# Patient Record
Sex: MALE | Race: WHITE | Employment: OTHER | URBAN - METROPOLITAN AREA
[De-identification: names, ages, dates, MRNs, and addresses within clinical notes are randomized per-mention and may not be internally consistent; named-entity substitution may affect disease eponyms.]

---

## 2017-01-03 ENCOUNTER — CARE COORDINATION (OUTPATIENT)
Dept: CARE COORDINATION | Age: 82
End: 2017-01-03

## 2017-01-04 ENCOUNTER — CARE COORDINATION (OUTPATIENT)
Dept: PRIMARY CARE CLINIC | Age: 82
End: 2017-01-04

## 2017-01-04 ENCOUNTER — ANTI-COAG VISIT (OUTPATIENT)
Dept: PHARMACY | Age: 82
End: 2017-01-04

## 2017-01-04 ENCOUNTER — OFFICE VISIT (OUTPATIENT)
Dept: PRIMARY CARE CLINIC | Age: 82
End: 2017-01-04

## 2017-01-04 VITALS
WEIGHT: 203 LBS | TEMPERATURE: 97 F | RESPIRATION RATE: 16 BRPM | HEART RATE: 68 BPM | DIASTOLIC BLOOD PRESSURE: 62 MMHG | HEIGHT: 69 IN | OXYGEN SATURATION: 90 % | SYSTOLIC BLOOD PRESSURE: 122 MMHG | BODY MASS INDEX: 30.07 KG/M2

## 2017-01-04 DIAGNOSIS — J18.9 PNEUMONIA DUE TO INFECTIOUS ORGANISM, UNSPECIFIED LATERALITY, UNSPECIFIED PART OF LUNG: Primary | ICD-10-CM

## 2017-01-04 DIAGNOSIS — R09.02 HYPOXIA: ICD-10-CM

## 2017-01-04 DIAGNOSIS — B35.6 TINEA CRURIS: ICD-10-CM

## 2017-01-04 DIAGNOSIS — R06.02 SHORTNESS OF BREATH: ICD-10-CM

## 2017-01-04 DIAGNOSIS — I48.0 PAROXYSMAL ATRIAL FIBRILLATION (HCC): ICD-10-CM

## 2017-01-04 DIAGNOSIS — I48.91 ATRIAL FIBRILLATION, UNSPECIFIED TYPE (HCC): ICD-10-CM

## 2017-01-04 PROCEDURE — 99495 TRANSJ CARE MGMT MOD F2F 14D: CPT | Performed by: FAMILY MEDICINE

## 2017-01-04 RX ORDER — DOXYCYCLINE HYCLATE 100 MG/1
100 CAPSULE ORAL 2 TIMES DAILY
Qty: 10 CAPSULE | Refills: 0 | Status: SHIPPED | OUTPATIENT
Start: 2017-01-04 | End: 2017-01-09

## 2017-01-04 RX ORDER — DOXYCYCLINE HYCLATE 100 MG/1
CAPSULE ORAL
COMMUNITY
Start: 2017-01-03 | End: 2017-04-20

## 2017-01-04 RX ORDER — NYSTATIN 100000 [USP'U]/G
POWDER TOPICAL
Qty: 60 G | Refills: 3 | Status: SHIPPED | OUTPATIENT
Start: 2017-01-04 | End: 2017-04-20

## 2017-01-04 ASSESSMENT — ENCOUNTER SYMPTOMS
NAUSEA: 0
EYE DISCHARGE: 0
FACIAL SWELLING: 0
WHEEZING: 0
STRIDOR: 0
PHOTOPHOBIA: 0
COLOR CHANGE: 0
CONSTIPATION: 0
APNEA: 0
EYE PAIN: 0
DIARRHEA: 0
CHOKING: 0
EYE REDNESS: 0
CHEST TIGHTNESS: 0
ABDOMINAL PAIN: 0

## 2017-01-06 ENCOUNTER — HOSPITAL ENCOUNTER (OUTPATIENT)
Dept: PHARMACY | Age: 82
Discharge: HOME | End: 2017-01-06
Payer: MEDICARE

## 2017-01-06 DIAGNOSIS — I48.91 ATRIAL FIBRILLATION, UNSPECIFIED TYPE (HCC): ICD-10-CM

## 2017-01-06 LAB
INR BLD: 1.2
PROTIME: 13.8 SECONDS

## 2017-01-06 PROCEDURE — G0463 HOSPITAL OUTPT CLINIC VISIT: HCPCS | Performed by: PHARMACIST

## 2017-01-06 PROCEDURE — 85610 PROTHROMBIN TIME: CPT | Performed by: PHARMACIST

## 2017-01-12 ENCOUNTER — CARE COORDINATION (OUTPATIENT)
Dept: CARE COORDINATION | Age: 82
End: 2017-01-12

## 2017-01-13 ENCOUNTER — HOSPITAL ENCOUNTER (OUTPATIENT)
Dept: PHARMACY | Age: 82
Discharge: HOME | End: 2017-01-13
Payer: MEDICARE

## 2017-01-13 DIAGNOSIS — I48.91 ATRIAL FIBRILLATION, UNSPECIFIED TYPE (HCC): ICD-10-CM

## 2017-01-13 LAB
INR BLD: 1.2
PROTIME: 14.8 SECONDS

## 2017-01-13 PROCEDURE — G0463 HOSPITAL OUTPT CLINIC VISIT: HCPCS

## 2017-01-13 PROCEDURE — 85610 PROTHROMBIN TIME: CPT

## 2017-01-16 RX ORDER — PAROXETINE HYDROCHLORIDE 20 MG/1
TABLET, FILM COATED ORAL
Qty: 30 TABLET | Refills: 5 | Status: SHIPPED | OUTPATIENT
Start: 2017-01-16 | End: 2017-06-02 | Stop reason: SDUPTHER

## 2017-01-16 RX ORDER — SIMVASTATIN 20 MG
TABLET ORAL
Qty: 30 TABLET | Refills: 3 | Status: SHIPPED | OUTPATIENT
Start: 2017-01-16 | End: 2017-05-10 | Stop reason: SDUPTHER

## 2017-01-25 ENCOUNTER — CARE COORDINATOR VISIT (OUTPATIENT)
Dept: PRIMARY CARE CLINIC | Age: 82
End: 2017-01-25

## 2017-01-25 ENCOUNTER — OFFICE VISIT (OUTPATIENT)
Dept: PRIMARY CARE CLINIC | Age: 82
End: 2017-01-25

## 2017-01-25 VITALS
RESPIRATION RATE: 16 BRPM | TEMPERATURE: 97.4 F | HEIGHT: 68 IN | DIASTOLIC BLOOD PRESSURE: 54 MMHG | SYSTOLIC BLOOD PRESSURE: 84 MMHG | BODY MASS INDEX: 31.22 KG/M2 | HEART RATE: 55 BPM | OXYGEN SATURATION: 85 % | WEIGHT: 206 LBS

## 2017-01-25 DIAGNOSIS — G62.9 ACQUIRED POLYNEUROPATHY: ICD-10-CM

## 2017-01-25 DIAGNOSIS — R00.1 BRADYCARDIA: ICD-10-CM

## 2017-01-25 DIAGNOSIS — J18.9 PNEUMONIA DUE TO INFECTIOUS ORGANISM, UNSPECIFIED LATERALITY, UNSPECIFIED PART OF LUNG: Primary | ICD-10-CM

## 2017-01-25 DIAGNOSIS — I48.0 PAROXYSMAL ATRIAL FIBRILLATION (HCC): ICD-10-CM

## 2017-01-25 DIAGNOSIS — R06.09 DOE (DYSPNEA ON EXERTION): ICD-10-CM

## 2017-01-25 DIAGNOSIS — Q79.1 EVENTRATIONS, DIAPHRAGMATIC: ICD-10-CM

## 2017-01-25 DIAGNOSIS — I48.91 ATRIAL FIBRILLATION, UNSPECIFIED TYPE (HCC): ICD-10-CM

## 2017-01-25 PROCEDURE — 99214 OFFICE O/P EST MOD 30 MIN: CPT | Performed by: FAMILY MEDICINE

## 2017-01-25 PROCEDURE — G8419 CALC BMI OUT NRM PARAM NOF/U: HCPCS | Performed by: FAMILY MEDICINE

## 2017-01-25 PROCEDURE — 1123F ACP DISCUSS/DSCN MKR DOCD: CPT | Performed by: FAMILY MEDICINE

## 2017-01-25 PROCEDURE — G8484 FLU IMMUNIZE NO ADMIN: HCPCS | Performed by: FAMILY MEDICINE

## 2017-01-25 PROCEDURE — G8427 DOCREV CUR MEDS BY ELIG CLIN: HCPCS | Performed by: FAMILY MEDICINE

## 2017-01-25 PROCEDURE — 1036F TOBACCO NON-USER: CPT | Performed by: FAMILY MEDICINE

## 2017-01-25 PROCEDURE — 4040F PNEUMOC VAC/ADMIN/RCVD: CPT | Performed by: FAMILY MEDICINE

## 2017-01-25 PROCEDURE — G8598 ASA/ANTIPLAT THER USED: HCPCS | Performed by: FAMILY MEDICINE

## 2017-01-25 RX ORDER — LEVOFLOXACIN 500 MG/1
500 TABLET, FILM COATED ORAL DAILY
Qty: 10 TABLET | Refills: 0 | Status: SHIPPED | OUTPATIENT
Start: 2017-01-25 | End: 2017-02-04

## 2017-01-25 RX ORDER — TRIAMTERENE AND HYDROCHLOROTHIAZIDE 37.5; 25 MG/1; MG/1
1 TABLET ORAL DAILY
Qty: 30 TABLET | Refills: 5 | Status: SHIPPED | OUTPATIENT
Start: 2017-01-25 | End: 2017-04-20

## 2017-01-25 RX ORDER — LISINOPRIL 2.5 MG/1
TABLET ORAL
Refills: 3 | COMMUNITY
Start: 2017-01-17 | End: 2017-04-20

## 2017-01-25 ASSESSMENT — ENCOUNTER SYMPTOMS
SHORTNESS OF BREATH: 1
HEMOPTYSIS: 0
HOARSE VOICE: 0
TROUBLE SWALLOWING: 0
ABDOMINAL PAIN: 0
BOWEL INCONTINENCE: 0
CHEST TIGHTNESS: 0
FREQUENT THROAT CLEARING: 0
SORE THROAT: 0
RHINORRHEA: 1
DIFFICULTY BREATHING: 0
HEARTBURN: 0
COUGH: 0
BACK PAIN: 1
SPUTUM PRODUCTION: 0
WHEEZING: 0

## 2017-01-27 ENCOUNTER — HOSPITAL ENCOUNTER (OUTPATIENT)
Dept: PHARMACY | Age: 82
Discharge: HOME | End: 2017-01-27
Payer: MEDICARE

## 2017-01-27 DIAGNOSIS — I48.91 ATRIAL FIBRILLATION, UNSPECIFIED TYPE (HCC): ICD-10-CM

## 2017-01-27 LAB
INR BLD: 1.7
PROTIME: 20.6 SECONDS

## 2017-01-27 PROCEDURE — 85610 PROTHROMBIN TIME: CPT

## 2017-01-27 PROCEDURE — G0463 HOSPITAL OUTPT CLINIC VISIT: HCPCS

## 2017-02-10 ENCOUNTER — HOSPITAL ENCOUNTER (OUTPATIENT)
Dept: PHARMACY | Age: 82
Discharge: HOME | End: 2017-02-10
Payer: MEDICARE

## 2017-02-10 DIAGNOSIS — I48.91 ATRIAL FIBRILLATION, UNSPECIFIED TYPE (HCC): ICD-10-CM

## 2017-02-10 LAB
INR BLD: 1.7
PROTIME: 19.8 SECONDS

## 2017-02-10 PROCEDURE — 85610 PROTHROMBIN TIME: CPT | Performed by: PHARMACIST

## 2017-02-10 PROCEDURE — G0463 HOSPITAL OUTPT CLINIC VISIT: HCPCS | Performed by: PHARMACIST

## 2017-02-22 ENCOUNTER — CARE COORDINATION (OUTPATIENT)
Dept: PRIMARY CARE CLINIC | Age: 82
End: 2017-02-22

## 2017-02-24 ENCOUNTER — HOSPITAL ENCOUNTER (OUTPATIENT)
Dept: PHARMACY | Age: 82
Discharge: HOME | End: 2017-02-24
Payer: MEDICARE

## 2017-02-24 DIAGNOSIS — I48.91 ATRIAL FIBRILLATION, UNSPECIFIED TYPE (HCC): ICD-10-CM

## 2017-02-24 LAB
INR BLD: 1.7
PROTIME: 20.7 SECONDS

## 2017-02-24 PROCEDURE — 85610 PROTHROMBIN TIME: CPT

## 2017-02-24 PROCEDURE — G0463 HOSPITAL OUTPT CLINIC VISIT: HCPCS

## 2017-03-10 ENCOUNTER — HOSPITAL ENCOUNTER (OUTPATIENT)
Dept: PHARMACY | Age: 82
Discharge: HOME | End: 2017-03-10
Payer: MEDICARE

## 2017-03-10 DIAGNOSIS — I48.91 ATRIAL FIBRILLATION, UNSPECIFIED TYPE (HCC): ICD-10-CM

## 2017-03-10 LAB
INR BLD: 4.1
PROTIME: 48.8 SECONDS

## 2017-03-10 PROCEDURE — 85610 PROTHROMBIN TIME: CPT | Performed by: PHARMACIST

## 2017-03-10 PROCEDURE — G0463 HOSPITAL OUTPT CLINIC VISIT: HCPCS | Performed by: PHARMACIST

## 2017-03-22 ENCOUNTER — CARE COORDINATION (OUTPATIENT)
Dept: CARE COORDINATION | Age: 82
End: 2017-03-22

## 2017-03-24 ENCOUNTER — HOSPITAL ENCOUNTER (OUTPATIENT)
Dept: PHARMACY | Age: 82
Discharge: HOME | End: 2017-03-24
Payer: MEDICARE

## 2017-03-24 DIAGNOSIS — I48.91 ATRIAL FIBRILLATION, UNSPECIFIED TYPE (HCC): ICD-10-CM

## 2017-03-24 LAB
INR BLD: 2.4
PROTIME: 28.5 SECONDS

## 2017-03-24 PROCEDURE — G0463 HOSPITAL OUTPT CLINIC VISIT: HCPCS | Performed by: PHARMACIST

## 2017-03-24 PROCEDURE — 85610 PROTHROMBIN TIME: CPT | Performed by: PHARMACIST

## 2017-04-08 ENCOUNTER — OFFICE VISIT (OUTPATIENT)
Dept: PRIMARY CARE CLINIC | Age: 82
End: 2017-04-08

## 2017-04-08 VITALS
HEART RATE: 64 BPM | WEIGHT: 205 LBS | RESPIRATION RATE: 12 BRPM | DIASTOLIC BLOOD PRESSURE: 60 MMHG | HEIGHT: 68 IN | TEMPERATURE: 97.8 F | BODY MASS INDEX: 31.07 KG/M2 | SYSTOLIC BLOOD PRESSURE: 100 MMHG

## 2017-04-08 DIAGNOSIS — N62 GYNECOMASTIA: ICD-10-CM

## 2017-04-08 DIAGNOSIS — R53.83 OTHER FATIGUE: ICD-10-CM

## 2017-04-08 DIAGNOSIS — N40.0 BENIGN PROSTATIC HYPERPLASIA, PRESENCE OF LOWER URINARY TRACT SYMPTOMS UNSPECIFIED, UNSPECIFIED MORPHOLOGY: ICD-10-CM

## 2017-04-08 DIAGNOSIS — I38 VALVULAR HEART DISEASE: ICD-10-CM

## 2017-04-08 DIAGNOSIS — C45.9 MESOTHELIOMA (HCC): ICD-10-CM

## 2017-04-08 DIAGNOSIS — E11.8 TYPE 2 DIABETES MELLITUS WITH COMPLICATION, WITHOUT LONG-TERM CURRENT USE OF INSULIN (HCC): ICD-10-CM

## 2017-04-08 DIAGNOSIS — H91.93 HEARING LOSS, BILATERAL: ICD-10-CM

## 2017-04-08 DIAGNOSIS — R44.0 AUDITORY HALLUCINATIONS: Primary | ICD-10-CM

## 2017-04-08 DIAGNOSIS — R44.0 AUDITORY HALLUCINATIONS: ICD-10-CM

## 2017-04-08 LAB
ALBUMIN SERPL-MCNC: 3.7 G/DL (ref 3.9–4.9)
ALP BLD-CCNC: 70 U/L (ref 35–104)
ALT SERPL-CCNC: 12 U/L (ref 0–41)
ANION GAP SERPL CALCULATED.3IONS-SCNC: 8 MEQ/L (ref 7–13)
AST SERPL-CCNC: 18 U/L (ref 0–40)
BASOPHILS ABSOLUTE: 0 K/UL (ref 0–0.2)
BASOPHILS RELATIVE PERCENT: 0.5 %
BILIRUB SERPL-MCNC: 0.4 MG/DL (ref 0–1.2)
BUN BLDV-MCNC: 17 MG/DL (ref 8–23)
CALCIUM SERPL-MCNC: 9.3 MG/DL (ref 8.6–10.2)
CHLORIDE BLD-SCNC: 98 MEQ/L (ref 98–107)
CO2: 34 MEQ/L (ref 22–29)
CREAT SERPL-MCNC: 0.66 MG/DL (ref 0.7–1.2)
CREATININE URINE: 75.5 MG/DL
EOSINOPHILS ABSOLUTE: 0.5 K/UL (ref 0–0.7)
EOSINOPHILS RELATIVE PERCENT: 10.5 %
GFR AFRICAN AMERICAN: >60
GFR NON-AFRICAN AMERICAN: >60
GLOBULIN: 2.4 G/DL (ref 2.3–3.5)
GLUCOSE BLD-MCNC: 116 MG/DL (ref 74–109)
HBA1C MFR BLD: 5.7 % (ref 4.8–5.9)
HCT VFR BLD CALC: 36 % (ref 42–52)
HEMOGLOBIN: 11.5 G/DL (ref 14–18)
LYMPHOCYTES ABSOLUTE: 1 K/UL (ref 1–4.8)
LYMPHOCYTES RELATIVE PERCENT: 19.6 %
MCH RBC QN AUTO: 30.7 PG (ref 27–31.3)
MCHC RBC AUTO-ENTMCNC: 32 % (ref 33–37)
MCV RBC AUTO: 95.9 FL (ref 80–100)
MICROALBUMIN UR-MCNC: <1.2 MG/DL
MICROALBUMIN/CREAT UR-RTO: NORMAL MG/G (ref 0–30)
MONOCYTES ABSOLUTE: 0.5 K/UL (ref 0.2–0.8)
MONOCYTES RELATIVE PERCENT: 10.8 %
NEUTROPHILS ABSOLUTE: 2.9 K/UL (ref 1.4–6.5)
NEUTROPHILS RELATIVE PERCENT: 58.6 %
PDW BLD-RTO: 14.9 % (ref 11.5–14.5)
PLATELET # BLD: 155 K/UL (ref 130–400)
POTASSIUM SERPL-SCNC: 4.2 MEQ/L (ref 3.5–5.1)
RBC # BLD: 3.76 M/UL (ref 4.7–6.1)
SEDIMENTATION RATE, ERYTHROCYTE: 15 MM (ref 0–20)
SODIUM BLD-SCNC: 140 MEQ/L (ref 132–144)
TOTAL PROTEIN: 6.1 G/DL (ref 6.4–8.1)
TSH SERPL DL<=0.05 MIU/L-ACNC: 1.2 UIU/ML (ref 0.27–4.2)
WBC # BLD: 4.9 K/UL (ref 4.8–10.8)

## 2017-04-08 PROCEDURE — 1123F ACP DISCUSS/DSCN MKR DOCD: CPT | Performed by: FAMILY MEDICINE

## 2017-04-08 PROCEDURE — 1036F TOBACCO NON-USER: CPT | Performed by: FAMILY MEDICINE

## 2017-04-08 PROCEDURE — G8417 CALC BMI ABV UP PARAM F/U: HCPCS | Performed by: FAMILY MEDICINE

## 2017-04-08 PROCEDURE — G8598 ASA/ANTIPLAT THER USED: HCPCS | Performed by: FAMILY MEDICINE

## 2017-04-08 PROCEDURE — 99214 OFFICE O/P EST MOD 30 MIN: CPT | Performed by: FAMILY MEDICINE

## 2017-04-08 PROCEDURE — 4040F PNEUMOC VAC/ADMIN/RCVD: CPT | Performed by: FAMILY MEDICINE

## 2017-04-08 PROCEDURE — G8427 DOCREV CUR MEDS BY ELIG CLIN: HCPCS | Performed by: FAMILY MEDICINE

## 2017-04-08 RX ORDER — CHLORHEXIDINE GLUCONATE 0.12 MG/ML
15 RINSE ORAL 2 TIMES DAILY
Qty: 420 ML | Refills: 0 | Status: SHIPPED | OUTPATIENT
Start: 2017-04-08 | End: 2017-04-20

## 2017-04-08 ASSESSMENT — ENCOUNTER SYMPTOMS
ABDOMINAL PAIN: 0
BOWEL INCONTINENCE: 0
VOMITING: 0
BACK PAIN: 0
VISUAL CHANGE: 0
NAUSEA: 0
SHORTNESS OF BREATH: 0

## 2017-04-11 ENCOUNTER — HOSPITAL ENCOUNTER (OUTPATIENT)
Dept: CT IMAGING | Age: 82
Discharge: HOME | End: 2017-04-11
Payer: MEDICARE

## 2017-04-11 DIAGNOSIS — R44.0 AUDITORY HALLUCINATIONS: ICD-10-CM

## 2017-04-11 DIAGNOSIS — C45.9 MESOTHELIOMA (HCC): ICD-10-CM

## 2017-04-11 PROCEDURE — 70470 CT HEAD/BRAIN W/O & W/DYE: CPT

## 2017-04-11 PROCEDURE — 6360000004 HC RX CONTRAST MEDICATION: Performed by: FAMILY MEDICINE

## 2017-04-11 RX ORDER — FLUTICASONE PROPIONATE AND SALMETEROL XINAFOATE 115; 21 UG/1; UG/1
AEROSOL, METERED RESPIRATORY (INHALATION)
Qty: 12 INHALER | Refills: 3 | Status: SHIPPED | OUTPATIENT
Start: 2017-04-11 | End: 2018-01-31 | Stop reason: SDUPTHER

## 2017-04-11 RX ADMIN — IOVERSOL 50 ML: 678 INJECTION INTRA-ARTERIAL; INTRAVENOUS at 11:09

## 2017-04-14 ENCOUNTER — HOSPITAL ENCOUNTER (OUTPATIENT)
Dept: PHARMACY | Age: 82
Discharge: HOME | End: 2017-04-14
Payer: MEDICARE

## 2017-04-14 DIAGNOSIS — I48.91 ATRIAL FIBRILLATION, UNSPECIFIED TYPE (HCC): ICD-10-CM

## 2017-04-14 LAB
INR BLD: 2.1
PROTIME: 25.1 SECONDS

## 2017-04-14 PROCEDURE — 85610 PROTHROMBIN TIME: CPT | Performed by: PHARMACIST

## 2017-04-14 PROCEDURE — G0463 HOSPITAL OUTPT CLINIC VISIT: HCPCS | Performed by: PHARMACIST

## 2017-04-19 ENCOUNTER — CARE COORDINATION (OUTPATIENT)
Dept: CARE COORDINATION | Age: 82
End: 2017-04-19

## 2017-04-20 ENCOUNTER — OFFICE VISIT (OUTPATIENT)
Dept: SURGERY | Age: 82
End: 2017-04-20

## 2017-04-20 VITALS
SYSTOLIC BLOOD PRESSURE: 120 MMHG | DIASTOLIC BLOOD PRESSURE: 60 MMHG | WEIGHT: 197 LBS | TEMPERATURE: 97 F | BODY MASS INDEX: 29.95 KG/M2

## 2017-04-20 DIAGNOSIS — N62 GYNECOMASTIA, MALE: Primary | ICD-10-CM

## 2017-04-20 PROCEDURE — 4040F PNEUMOC VAC/ADMIN/RCVD: CPT | Performed by: SURGERY

## 2017-04-20 PROCEDURE — 1123F ACP DISCUSS/DSCN MKR DOCD: CPT | Performed by: SURGERY

## 2017-04-20 PROCEDURE — G8427 DOCREV CUR MEDS BY ELIG CLIN: HCPCS | Performed by: SURGERY

## 2017-04-20 PROCEDURE — G8420 CALC BMI NORM PARAMETERS: HCPCS | Performed by: SURGERY

## 2017-04-20 PROCEDURE — 99202 OFFICE O/P NEW SF 15 MIN: CPT | Performed by: SURGERY

## 2017-04-20 PROCEDURE — G8598 ASA/ANTIPLAT THER USED: HCPCS | Performed by: SURGERY

## 2017-04-20 PROCEDURE — 1036F TOBACCO NON-USER: CPT | Performed by: SURGERY

## 2017-04-20 ASSESSMENT — ENCOUNTER SYMPTOMS
GASTROINTESTINAL NEGATIVE: 1
EYES NEGATIVE: 1
CHEST TIGHTNESS: 0
ABDOMINAL PAIN: 0
RHINORRHEA: 0
COLOR CHANGE: 0
CONSTIPATION: 0
BLOOD IN STOOL: 0
RESPIRATORY NEGATIVE: 1
ABDOMINAL DISTENTION: 0
SHORTNESS OF BREATH: 0
ALLERGIC/IMMUNOLOGIC NEGATIVE: 1

## 2017-05-10 RX ORDER — SIMVASTATIN 20 MG
TABLET ORAL
Qty: 30 TABLET | Refills: 5 | Status: SHIPPED | OUTPATIENT
Start: 2017-05-10 | End: 2017-11-19 | Stop reason: SDUPTHER

## 2017-05-11 RX ORDER — ZOLPIDEM TARTRATE 10 MG/1
TABLET ORAL
Qty: 90 TABLET | Refills: 1 | Status: SHIPPED | OUTPATIENT
Start: 2017-05-11 | End: 2017-05-26 | Stop reason: SDUPTHER

## 2017-05-12 ENCOUNTER — HOSPITAL ENCOUNTER (OUTPATIENT)
Dept: PHARMACY | Age: 82
Discharge: HOME | End: 2017-05-12
Payer: MEDICARE

## 2017-05-12 DIAGNOSIS — I48.91 ATRIAL FIBRILLATION, UNSPECIFIED TYPE (HCC): ICD-10-CM

## 2017-05-12 LAB
INR BLD: 3
PROTIME: 36.2 SECONDS

## 2017-05-12 PROCEDURE — 99211 OFF/OP EST MAY X REQ PHY/QHP: CPT

## 2017-05-12 PROCEDURE — 85610 PROTHROMBIN TIME: CPT

## 2017-05-16 ENCOUNTER — CARE COORDINATION (OUTPATIENT)
Dept: PRIMARY CARE CLINIC | Age: 82
End: 2017-05-16

## 2017-06-02 RX ORDER — PAROXETINE HYDROCHLORIDE 20 MG/1
TABLET, FILM COATED ORAL
Qty: 30 TABLET | Refills: 5 | Status: SHIPPED | OUTPATIENT
Start: 2017-06-02 | End: 2017-08-15 | Stop reason: DRUGHIGH

## 2017-06-09 ENCOUNTER — HOSPITAL ENCOUNTER (OUTPATIENT)
Dept: PHARMACY | Age: 82
Discharge: HOME | End: 2017-06-09
Payer: MEDICARE

## 2017-06-09 DIAGNOSIS — I48.91 ATRIAL FIBRILLATION, UNSPECIFIED TYPE (HCC): ICD-10-CM

## 2017-06-09 LAB
INR BLD: 2.6
PROTIME: 31.2 SECONDS

## 2017-06-09 PROCEDURE — 99211 OFF/OP EST MAY X REQ PHY/QHP: CPT

## 2017-06-09 PROCEDURE — 85610 PROTHROMBIN TIME: CPT

## 2017-06-27 ENCOUNTER — CARE COORDINATION (OUTPATIENT)
Dept: CARE COORDINATION | Age: 82
End: 2017-06-27

## 2017-07-14 ENCOUNTER — OFFICE VISIT (OUTPATIENT)
Dept: PRIMARY CARE CLINIC | Age: 82
End: 2017-07-14

## 2017-07-14 ENCOUNTER — HOSPITAL ENCOUNTER (OUTPATIENT)
Dept: PHARMACY | Age: 82
Setting detail: THERAPIES SERIES
Discharge: HOME | End: 2017-07-14
Payer: MEDICARE

## 2017-07-14 VITALS
OXYGEN SATURATION: 91 % | TEMPERATURE: 98.1 F | RESPIRATION RATE: 17 BRPM | SYSTOLIC BLOOD PRESSURE: 108 MMHG | HEIGHT: 68 IN | HEART RATE: 76 BPM | WEIGHT: 206 LBS | DIASTOLIC BLOOD PRESSURE: 72 MMHG | BODY MASS INDEX: 31.22 KG/M2

## 2017-07-14 DIAGNOSIS — R44.0 CONTINUOUS AUDITORY HALLUCINATIONS: Primary | ICD-10-CM

## 2017-07-14 DIAGNOSIS — H91.93 HEARING LOSS, BILATERAL: ICD-10-CM

## 2017-07-14 DIAGNOSIS — I48.91 ATRIAL FIBRILLATION, UNSPECIFIED TYPE (HCC): ICD-10-CM

## 2017-07-14 DIAGNOSIS — G47.9 SLEEP DISTURBANCE: ICD-10-CM

## 2017-07-14 LAB
INR BLD: 3.7
PROTIME: 44.1 SECONDS

## 2017-07-14 PROCEDURE — 99214 OFFICE O/P EST MOD 30 MIN: CPT | Performed by: FAMILY MEDICINE

## 2017-07-14 PROCEDURE — 1123F ACP DISCUSS/DSCN MKR DOCD: CPT | Performed by: FAMILY MEDICINE

## 2017-07-14 PROCEDURE — G8598 ASA/ANTIPLAT THER USED: HCPCS | Performed by: FAMILY MEDICINE

## 2017-07-14 PROCEDURE — 4040F PNEUMOC VAC/ADMIN/RCVD: CPT | Performed by: FAMILY MEDICINE

## 2017-07-14 PROCEDURE — G0444 DEPRESSION SCREEN ANNUAL: HCPCS | Performed by: FAMILY MEDICINE

## 2017-07-14 PROCEDURE — 99211 OFF/OP EST MAY X REQ PHY/QHP: CPT | Performed by: PHARMACIST

## 2017-07-14 PROCEDURE — G8427 DOCREV CUR MEDS BY ELIG CLIN: HCPCS | Performed by: FAMILY MEDICINE

## 2017-07-14 PROCEDURE — G8417 CALC BMI ABV UP PARAM F/U: HCPCS | Performed by: FAMILY MEDICINE

## 2017-07-14 PROCEDURE — 85610 PROTHROMBIN TIME: CPT | Performed by: PHARMACIST

## 2017-07-14 PROCEDURE — 3288F FALL RISK ASSESSMENT DOCD: CPT | Performed by: FAMILY MEDICINE

## 2017-07-14 PROCEDURE — 1036F TOBACCO NON-USER: CPT | Performed by: FAMILY MEDICINE

## 2017-07-14 RX ORDER — DIAZEPAM 2 MG/1
2 TABLET ORAL 4 TIMES DAILY
Qty: 40 TABLET | Refills: 1 | Status: SHIPPED | OUTPATIENT
Start: 2017-07-14 | End: 2017-07-21 | Stop reason: DRUGHIGH

## 2017-07-14 RX ORDER — DIAZEPAM 2 MG/1
2 TABLET ORAL EVERY 6 HOURS
Qty: 120 TABLET | Refills: 0 | Status: CANCELLED | OUTPATIENT
Start: 2017-07-14

## 2017-07-14 ASSESSMENT — PATIENT HEALTH QUESTIONNAIRE - PHQ9
9. THOUGHTS THAT YOU WOULD BE BETTER OFF DEAD, OR OF HURTING YOURSELF: 0
1. LITTLE INTEREST OR PLEASURE IN DOING THINGS: 3
5. POOR APPETITE OR OVEREATING: 0
SUM OF ALL RESPONSES TO PHQ9 QUESTIONS 1 & 2: 6
6. FEELING BAD ABOUT YOURSELF - OR THAT YOU ARE A FAILURE OR HAVE LET YOURSELF OR YOUR FAMILY DOWN: 0
10. IF YOU CHECKED OFF ANY PROBLEMS, HOW DIFFICULT HAVE THESE PROBLEMS MADE IT FOR YOU TO DO YOUR WORK, TAKE CARE OF THINGS AT HOME, OR GET ALONG WITH OTHER PEOPLE: 1
SUM OF ALL RESPONSES TO PHQ QUESTIONS 1-9: 15
7. TROUBLE CONCENTRATING ON THINGS, SUCH AS READING THE NEWSPAPER OR WATCHING TELEVISION: 2
8. MOVING OR SPEAKING SO SLOWLY THAT OTHER PEOPLE COULD HAVE NOTICED. OR THE OPPOSITE, BEING SO FIGETY OR RESTLESS THAT YOU HAVE BEEN MOVING AROUND A LOT MORE THAN USUAL: 1
3. TROUBLE FALLING OR STAYING ASLEEP: 3
2. FEELING DOWN, DEPRESSED OR HOPELESS: 3
4. FEELING TIRED OR HAVING LITTLE ENERGY: 3

## 2017-07-14 ASSESSMENT — ENCOUNTER SYMPTOMS
VOMITING: 0
VISUAL CHANGE: 0
BOWEL INCONTINENCE: 0
NAUSEA: 0
SHORTNESS OF BREATH: 0
ABDOMINAL PAIN: 0

## 2017-07-19 RX ORDER — TRIAMTERENE AND HYDROCHLOROTHIAZIDE 37.5; 25 MG/1; MG/1
TABLET ORAL
Qty: 30 TABLET | Refills: 4 | Status: SHIPPED | OUTPATIENT
Start: 2017-07-19 | End: 2017-12-18 | Stop reason: SDUPTHER

## 2017-07-21 ENCOUNTER — OFFICE VISIT (OUTPATIENT)
Dept: PRIMARY CARE CLINIC | Age: 82
End: 2017-07-21

## 2017-07-21 VITALS
HEART RATE: 68 BPM | HEIGHT: 68 IN | SYSTOLIC BLOOD PRESSURE: 88 MMHG | WEIGHT: 205 LBS | DIASTOLIC BLOOD PRESSURE: 52 MMHG | RESPIRATION RATE: 14 BRPM | TEMPERATURE: 98.3 F | BODY MASS INDEX: 31.07 KG/M2 | OXYGEN SATURATION: 81 %

## 2017-07-21 DIAGNOSIS — R10.11 RIGHT UPPER QUADRANT ABDOMINAL PAIN: ICD-10-CM

## 2017-07-21 DIAGNOSIS — R44.0 AUDITORY HALLUCINATIONS: Primary | ICD-10-CM

## 2017-07-21 DIAGNOSIS — I95.9 HYPOTENSION, UNSPECIFIED HYPOTENSION TYPE: ICD-10-CM

## 2017-07-21 DIAGNOSIS — R09.02 HYPOXIA: ICD-10-CM

## 2017-07-21 PROCEDURE — 4040F PNEUMOC VAC/ADMIN/RCVD: CPT | Performed by: FAMILY MEDICINE

## 2017-07-21 PROCEDURE — G8598 ASA/ANTIPLAT THER USED: HCPCS | Performed by: FAMILY MEDICINE

## 2017-07-21 PROCEDURE — G8427 DOCREV CUR MEDS BY ELIG CLIN: HCPCS | Performed by: FAMILY MEDICINE

## 2017-07-21 PROCEDURE — 1036F TOBACCO NON-USER: CPT | Performed by: FAMILY MEDICINE

## 2017-07-21 PROCEDURE — 1123F ACP DISCUSS/DSCN MKR DOCD: CPT | Performed by: FAMILY MEDICINE

## 2017-07-21 PROCEDURE — 99214 OFFICE O/P EST MOD 30 MIN: CPT | Performed by: FAMILY MEDICINE

## 2017-07-21 PROCEDURE — G8417 CALC BMI ABV UP PARAM F/U: HCPCS | Performed by: FAMILY MEDICINE

## 2017-07-21 RX ORDER — DIAZEPAM 5 MG/1
5 TABLET ORAL EVERY 6 HOURS PRN
Qty: 120 TABLET | Refills: 0 | Status: CANCELLED | OUTPATIENT
Start: 2017-07-21 | End: 2017-07-31

## 2017-07-21 RX ORDER — LISINOPRIL 2.5 MG/1
TABLET ORAL
Refills: 3 | COMMUNITY
Start: 2017-07-09 | End: 2017-07-21

## 2017-07-21 RX ORDER — DIAZEPAM 5 MG/1
5 TABLET ORAL EVERY 8 HOURS PRN
Qty: 30 TABLET | Refills: 1 | Status: SHIPPED | OUTPATIENT
Start: 2017-07-21 | End: 2017-07-31

## 2017-07-21 ASSESSMENT — ENCOUNTER SYMPTOMS
WHEEZING: 0
EYE DISCHARGE: 0
COLOR CHANGE: 0
STRIDOR: 0
VOMITING: 0
BOWEL INCONTINENCE: 0
NAUSEA: 0
CHEST TIGHTNESS: 0
PHOTOPHOBIA: 0
CHOKING: 0
FACIAL SWELLING: 0
EYE REDNESS: 0
DIARRHEA: 0
APNEA: 0
SHORTNESS OF BREATH: 1
ABDOMINAL PAIN: 1
CONSTIPATION: 0
EYE PAIN: 0
VISUAL CHANGE: 0
BACK PAIN: 0

## 2017-07-24 ENCOUNTER — TELEPHONE (OUTPATIENT)
Dept: PRIMARY CARE CLINIC | Age: 82
End: 2017-07-24

## 2017-07-28 ENCOUNTER — HOSPITAL ENCOUNTER (OUTPATIENT)
Dept: PHARMACY | Age: 82
Setting detail: THERAPIES SERIES
Discharge: HOME | End: 2017-07-28
Payer: MEDICARE

## 2017-07-28 DIAGNOSIS — I48.91 ATRIAL FIBRILLATION, UNSPECIFIED TYPE (HCC): ICD-10-CM

## 2017-07-28 LAB
INR BLD: 4.4
PROTIME: 52.4 SECONDS

## 2017-07-28 PROCEDURE — 85610 PROTHROMBIN TIME: CPT

## 2017-07-28 PROCEDURE — 99211 OFF/OP EST MAY X REQ PHY/QHP: CPT

## 2017-08-01 ENCOUNTER — OFFICE VISIT (OUTPATIENT)
Dept: PRIMARY CARE CLINIC | Age: 82
End: 2017-08-01

## 2017-08-01 VITALS
DIASTOLIC BLOOD PRESSURE: 58 MMHG | WEIGHT: 212 LBS | RESPIRATION RATE: 16 BRPM | HEIGHT: 68 IN | HEART RATE: 60 BPM | TEMPERATURE: 98 F | SYSTOLIC BLOOD PRESSURE: 112 MMHG | BODY MASS INDEX: 32.13 KG/M2

## 2017-08-01 DIAGNOSIS — E53.8 B12 DEFICIENCY: ICD-10-CM

## 2017-08-01 DIAGNOSIS — E78.5 HYPERLIPIDEMIA, UNSPECIFIED HYPERLIPIDEMIA TYPE: ICD-10-CM

## 2017-08-01 DIAGNOSIS — Z79.01 WARFARIN ANTICOAGULATION: ICD-10-CM

## 2017-08-01 DIAGNOSIS — I95.9 HYPOTENSION, UNSPECIFIED HYPOTENSION TYPE: ICD-10-CM

## 2017-08-01 DIAGNOSIS — E11.8 TYPE 2 DIABETES MELLITUS WITH COMPLICATION, WITHOUT LONG-TERM CURRENT USE OF INSULIN (HCC): Primary | ICD-10-CM

## 2017-08-01 DIAGNOSIS — E11.8 TYPE 2 DIABETES MELLITUS WITH COMPLICATION, WITHOUT LONG-TERM CURRENT USE OF INSULIN (HCC): ICD-10-CM

## 2017-08-01 DIAGNOSIS — M47.812 CERVICAL SPONDYLOSIS WITHOUT MYELOPATHY: ICD-10-CM

## 2017-08-01 DIAGNOSIS — I38 VALVULAR HEART DISEASE: ICD-10-CM

## 2017-08-01 DIAGNOSIS — I10 ESSENTIAL HYPERTENSION: ICD-10-CM

## 2017-08-01 DIAGNOSIS — I25.10 CORONARY ARTERY DISEASE INVOLVING NATIVE CORONARY ARTERY OF NATIVE HEART WITHOUT ANGINA PECTORIS: ICD-10-CM

## 2017-08-01 LAB
ALBUMIN SERPL-MCNC: 3.5 G/DL (ref 3.9–4.9)
ALP BLD-CCNC: 81 U/L (ref 35–104)
ALT SERPL-CCNC: 12 U/L (ref 0–41)
ANION GAP SERPL CALCULATED.3IONS-SCNC: 11 MEQ/L (ref 7–13)
AST SERPL-CCNC: 14 U/L (ref 0–40)
BILIRUB SERPL-MCNC: 0.5 MG/DL (ref 0–1.2)
BUN BLDV-MCNC: 15 MG/DL (ref 8–23)
CALCIUM SERPL-MCNC: 9.1 MG/DL (ref 8.6–10.2)
CHLORIDE BLD-SCNC: 102 MEQ/L (ref 98–107)
CO2: 33 MEQ/L (ref 22–29)
CREAT SERPL-MCNC: 0.64 MG/DL (ref 0.7–1.2)
GFR AFRICAN AMERICAN: >60
GFR NON-AFRICAN AMERICAN: >60
GLOBULIN: 2.3 G/DL (ref 2.3–3.5)
GLUCOSE BLD-MCNC: 114 MG/DL (ref 74–109)
MAGNESIUM: 2 MG/DL (ref 1.7–2.3)
POTASSIUM SERPL-SCNC: 4.9 MEQ/L (ref 3.5–5.1)
SODIUM BLD-SCNC: 146 MEQ/L (ref 132–144)
TOTAL PROTEIN: 5.8 G/DL (ref 6.4–8.1)

## 2017-08-01 PROCEDURE — G8598 ASA/ANTIPLAT THER USED: HCPCS | Performed by: FAMILY MEDICINE

## 2017-08-01 PROCEDURE — 96372 THER/PROPH/DIAG INJ SC/IM: CPT | Performed by: FAMILY MEDICINE

## 2017-08-01 PROCEDURE — 1123F ACP DISCUSS/DSCN MKR DOCD: CPT | Performed by: FAMILY MEDICINE

## 2017-08-01 PROCEDURE — 99214 OFFICE O/P EST MOD 30 MIN: CPT | Performed by: FAMILY MEDICINE

## 2017-08-01 PROCEDURE — 1036F TOBACCO NON-USER: CPT | Performed by: FAMILY MEDICINE

## 2017-08-01 PROCEDURE — G8417 CALC BMI ABV UP PARAM F/U: HCPCS | Performed by: FAMILY MEDICINE

## 2017-08-01 PROCEDURE — G8427 DOCREV CUR MEDS BY ELIG CLIN: HCPCS | Performed by: FAMILY MEDICINE

## 2017-08-01 PROCEDURE — 4040F PNEUMOC VAC/ADMIN/RCVD: CPT | Performed by: FAMILY MEDICINE

## 2017-08-01 RX ORDER — CYANOCOBALAMIN 1000 UG/ML
1000 INJECTION INTRAMUSCULAR; SUBCUTANEOUS ONCE
Status: COMPLETED | OUTPATIENT
Start: 2017-08-01 | End: 2017-08-01

## 2017-08-01 RX ADMIN — CYANOCOBALAMIN 1000 MCG: 1000 INJECTION INTRAMUSCULAR; SUBCUTANEOUS at 09:57

## 2017-08-01 ASSESSMENT — ENCOUNTER SYMPTOMS
EYE PAIN: 0
COLOR CHANGE: 0
APNEA: 0
CONSTIPATION: 1
CHOKING: 0
EYE REDNESS: 0
EYE DISCHARGE: 0
CHEST TIGHTNESS: 0
WHEEZING: 0
NAUSEA: 0
STRIDOR: 0
DIARRHEA: 0
FACIAL SWELLING: 0
ABDOMINAL PAIN: 0
PHOTOPHOBIA: 0

## 2017-08-02 ENCOUNTER — HOSPITAL ENCOUNTER (OUTPATIENT)
Dept: ULTRASOUND IMAGING | Age: 82
Discharge: HOME | End: 2017-08-02
Payer: MEDICARE

## 2017-08-02 DIAGNOSIS — R10.11 RIGHT UPPER QUADRANT ABDOMINAL PAIN: ICD-10-CM

## 2017-08-02 PROCEDURE — 76705 ECHO EXAM OF ABDOMEN: CPT

## 2017-08-14 ENCOUNTER — ANTI-COAG VISIT (OUTPATIENT)
Dept: PHARMACY | Age: 82
End: 2017-08-14

## 2017-08-14 DIAGNOSIS — I48.91 ATRIAL FIBRILLATION, UNSPECIFIED TYPE (HCC): ICD-10-CM

## 2017-08-15 ENCOUNTER — OFFICE VISIT (OUTPATIENT)
Dept: PRIMARY CARE CLINIC | Age: 82
End: 2017-08-15

## 2017-08-15 VITALS
RESPIRATION RATE: 12 BRPM | HEIGHT: 68 IN | BODY MASS INDEX: 29.55 KG/M2 | SYSTOLIC BLOOD PRESSURE: 102 MMHG | HEART RATE: 72 BPM | WEIGHT: 195 LBS | DIASTOLIC BLOOD PRESSURE: 56 MMHG | TEMPERATURE: 98.3 F

## 2017-08-15 DIAGNOSIS — L98.9 SKIN LESION OF FACE: ICD-10-CM

## 2017-08-15 DIAGNOSIS — R63.4 UNINTENDED WEIGHT LOSS: ICD-10-CM

## 2017-08-15 DIAGNOSIS — L82.1 SEBORRHEIC KERATOSES: ICD-10-CM

## 2017-08-15 DIAGNOSIS — R63.0 LOSS OF APPETITE: ICD-10-CM

## 2017-08-15 DIAGNOSIS — R53.83 FATIGUE, UNSPECIFIED TYPE: Primary | ICD-10-CM

## 2017-08-15 DIAGNOSIS — G47.10 HYPERSOMNIA: ICD-10-CM

## 2017-08-15 PROCEDURE — 99214 OFFICE O/P EST MOD 30 MIN: CPT | Performed by: FAMILY MEDICINE

## 2017-08-15 PROCEDURE — G8427 DOCREV CUR MEDS BY ELIG CLIN: HCPCS | Performed by: FAMILY MEDICINE

## 2017-08-15 PROCEDURE — G8417 CALC BMI ABV UP PARAM F/U: HCPCS | Performed by: FAMILY MEDICINE

## 2017-08-15 PROCEDURE — 1123F ACP DISCUSS/DSCN MKR DOCD: CPT | Performed by: FAMILY MEDICINE

## 2017-08-15 PROCEDURE — G8598 ASA/ANTIPLAT THER USED: HCPCS | Performed by: FAMILY MEDICINE

## 2017-08-15 PROCEDURE — 4040F PNEUMOC VAC/ADMIN/RCVD: CPT | Performed by: FAMILY MEDICINE

## 2017-08-15 PROCEDURE — 1036F TOBACCO NON-USER: CPT | Performed by: FAMILY MEDICINE

## 2017-08-15 RX ORDER — BUPROPION HYDROCHLORIDE 150 MG/1
150 TABLET ORAL EVERY MORNING
Qty: 30 TABLET | Refills: 3 | Status: SHIPPED | OUTPATIENT
Start: 2017-08-15 | End: 2018-06-06 | Stop reason: ALTCHOICE

## 2017-08-15 RX ORDER — PANTOPRAZOLE SODIUM 40 MG/1
TABLET, DELAYED RELEASE ORAL
Refills: 11 | COMMUNITY
Start: 2017-07-31 | End: 2017-08-15

## 2017-08-15 RX ORDER — DIAZEPAM 5 MG/1
5 TABLET ORAL EVERY 6 HOURS PRN
COMMUNITY
End: 2017-10-30 | Stop reason: SDUPTHER

## 2017-08-15 RX ORDER — PAROXETINE 10 MG/1
10 TABLET, FILM COATED ORAL DAILY
Qty: 30 TABLET | Refills: 6 | Status: SHIPPED | OUTPATIENT
Start: 2017-08-15 | End: 2017-11-21 | Stop reason: SDUPTHER

## 2017-08-15 ASSESSMENT — ENCOUNTER SYMPTOMS
DIARRHEA: 0
CONSTIPATION: 0
CHOKING: 0
PHOTOPHOBIA: 0
WHEEZING: 0
CHEST TIGHTNESS: 0
FACIAL SWELLING: 0
NAUSEA: 0
APNEA: 0
EYE REDNESS: 0
EYE DISCHARGE: 0
STRIDOR: 0
EYE PAIN: 0
ABDOMINAL PAIN: 0
COLOR CHANGE: 0

## 2017-08-16 ENCOUNTER — HOSPITAL ENCOUNTER (OUTPATIENT)
Dept: PHARMACY | Age: 82
Setting detail: THERAPIES SERIES
Discharge: HOME | End: 2017-08-16
Payer: MEDICARE

## 2017-08-16 ENCOUNTER — CARE COORDINATOR VISIT (OUTPATIENT)
Dept: CARE COORDINATION | Age: 82
End: 2017-08-16

## 2017-08-16 ENCOUNTER — PROCEDURE VISIT (OUTPATIENT)
Dept: PRIMARY CARE CLINIC | Age: 82
End: 2017-08-16

## 2017-08-16 VITALS
RESPIRATION RATE: 24 BRPM | HEIGHT: 68 IN | WEIGHT: 197 LBS | BODY MASS INDEX: 29.86 KG/M2 | DIASTOLIC BLOOD PRESSURE: 60 MMHG | HEART RATE: 72 BPM | SYSTOLIC BLOOD PRESSURE: 104 MMHG | TEMPERATURE: 97 F

## 2017-08-16 DIAGNOSIS — L98.9 LESION OF SKIN OF FACE: Primary | ICD-10-CM

## 2017-08-16 DIAGNOSIS — I10 ESSENTIAL HYPERTENSION: ICD-10-CM

## 2017-08-16 DIAGNOSIS — E11.8 TYPE 2 DIABETES MELLITUS WITH COMPLICATION, WITHOUT LONG-TERM CURRENT USE OF INSULIN (HCC): ICD-10-CM

## 2017-08-16 DIAGNOSIS — C44.91 BASAL CELL CARCINOMA: ICD-10-CM

## 2017-08-16 DIAGNOSIS — D22.30 MELANOCYTIC NEVI OF FACE: ICD-10-CM

## 2017-08-16 DIAGNOSIS — Z79.01 WARFARIN ANTICOAGULATION: ICD-10-CM

## 2017-08-16 DIAGNOSIS — I48.91 ATRIAL FIBRILLATION, UNSPECIFIED TYPE (HCC): ICD-10-CM

## 2017-08-16 LAB
INR BLD: 3.8
PROTIME: 45.1 SECONDS

## 2017-08-16 PROCEDURE — G8417 CALC BMI ABV UP PARAM F/U: HCPCS | Performed by: FAMILY MEDICINE

## 2017-08-16 PROCEDURE — 1123F ACP DISCUSS/DSCN MKR DOCD: CPT | Performed by: FAMILY MEDICINE

## 2017-08-16 PROCEDURE — 11311 SHAVE SKIN LESION 0.6-1.0 CM: CPT | Performed by: FAMILY MEDICINE

## 2017-08-16 PROCEDURE — 85610 PROTHROMBIN TIME: CPT

## 2017-08-16 PROCEDURE — 1036F TOBACCO NON-USER: CPT | Performed by: FAMILY MEDICINE

## 2017-08-16 PROCEDURE — 4040F PNEUMOC VAC/ADMIN/RCVD: CPT | Performed by: FAMILY MEDICINE

## 2017-08-16 PROCEDURE — G8598 ASA/ANTIPLAT THER USED: HCPCS | Performed by: FAMILY MEDICINE

## 2017-08-16 PROCEDURE — 99211 OFF/OP EST MAY X REQ PHY/QHP: CPT

## 2017-08-16 PROCEDURE — G8427 DOCREV CUR MEDS BY ELIG CLIN: HCPCS | Performed by: FAMILY MEDICINE

## 2017-08-16 PROCEDURE — 99213 OFFICE O/P EST LOW 20 MIN: CPT | Performed by: FAMILY MEDICINE

## 2017-08-16 ASSESSMENT — ENCOUNTER SYMPTOMS
STRIDOR: 0
WHEEZING: 0
PHOTOPHOBIA: 0
CHEST TIGHTNESS: 0
FACIAL SWELLING: 0
DIARRHEA: 0
EYE PAIN: 0
COLOR CHANGE: 0
NAUSEA: 0
EYE REDNESS: 0
CONSTIPATION: 0
ABDOMINAL PAIN: 0
EYE DISCHARGE: 0
CHOKING: 0
APNEA: 0

## 2017-08-28 ENCOUNTER — TELEPHONE (OUTPATIENT)
Dept: PRIMARY CARE CLINIC | Age: 82
End: 2017-08-28

## 2017-08-28 DIAGNOSIS — C44.91 SKIN CANCER, BASAL CELL: Primary | ICD-10-CM

## 2017-08-29 ENCOUNTER — NURSE ONLY (OUTPATIENT)
Dept: PRIMARY CARE CLINIC | Age: 82
End: 2017-08-29

## 2017-08-29 DIAGNOSIS — E53.8 VITAMIN B 12 DEFICIENCY: Primary | ICD-10-CM

## 2017-08-29 PROCEDURE — 96372 THER/PROPH/DIAG INJ SC/IM: CPT | Performed by: FAMILY MEDICINE

## 2017-08-29 RX ORDER — CYANOCOBALAMIN 1000 UG/ML
1000 INJECTION INTRAMUSCULAR; SUBCUTANEOUS ONCE
Status: COMPLETED | OUTPATIENT
Start: 2017-08-29 | End: 2017-08-29

## 2017-08-29 RX ADMIN — CYANOCOBALAMIN 1000 MCG: 1000 INJECTION INTRAMUSCULAR; SUBCUTANEOUS at 08:35

## 2017-08-30 ENCOUNTER — OFFICE VISIT (OUTPATIENT)
Dept: PRIMARY CARE CLINIC | Age: 82
End: 2017-08-30

## 2017-08-30 ENCOUNTER — CARE COORDINATOR VISIT (OUTPATIENT)
Dept: CARE COORDINATION | Age: 82
End: 2017-08-30

## 2017-08-30 ENCOUNTER — HOSPITAL ENCOUNTER (OUTPATIENT)
Dept: PHARMACY | Age: 82
Setting detail: THERAPIES SERIES
Discharge: HOME | End: 2017-08-30
Payer: MEDICARE

## 2017-08-30 VITALS
RESPIRATION RATE: 24 BRPM | HEIGHT: 68 IN | HEART RATE: 72 BPM | WEIGHT: 197 LBS | BODY MASS INDEX: 29.86 KG/M2 | SYSTOLIC BLOOD PRESSURE: 114 MMHG | DIASTOLIC BLOOD PRESSURE: 64 MMHG | TEMPERATURE: 97 F

## 2017-08-30 DIAGNOSIS — I48.91 ATRIAL FIBRILLATION, UNSPECIFIED TYPE (HCC): ICD-10-CM

## 2017-08-30 DIAGNOSIS — Z23 NEED FOR INFLUENZA VACCINATION: ICD-10-CM

## 2017-08-30 DIAGNOSIS — R44.0 AUDITORY HALLUCINATIONS: ICD-10-CM

## 2017-08-30 DIAGNOSIS — C44.310 BCC (BASAL CELL CARCINOMA), FACE: Primary | ICD-10-CM

## 2017-08-30 DIAGNOSIS — L98.9 SKIN LESION OF FACE: ICD-10-CM

## 2017-08-30 LAB
INR BLD: 2
PROTIME: 24.4 SECONDS

## 2017-08-30 PROCEDURE — G8598 ASA/ANTIPLAT THER USED: HCPCS | Performed by: FAMILY MEDICINE

## 2017-08-30 PROCEDURE — 1036F TOBACCO NON-USER: CPT | Performed by: FAMILY MEDICINE

## 2017-08-30 PROCEDURE — G0008 ADMIN INFLUENZA VIRUS VAC: HCPCS | Performed by: FAMILY MEDICINE

## 2017-08-30 PROCEDURE — 99213 OFFICE O/P EST LOW 20 MIN: CPT | Performed by: FAMILY MEDICINE

## 2017-08-30 PROCEDURE — G8417 CALC BMI ABV UP PARAM F/U: HCPCS | Performed by: FAMILY MEDICINE

## 2017-08-30 PROCEDURE — G8427 DOCREV CUR MEDS BY ELIG CLIN: HCPCS | Performed by: FAMILY MEDICINE

## 2017-08-30 PROCEDURE — 99211 OFF/OP EST MAY X REQ PHY/QHP: CPT | Performed by: PHARMACIST

## 2017-08-30 PROCEDURE — 85610 PROTHROMBIN TIME: CPT | Performed by: PHARMACIST

## 2017-08-30 PROCEDURE — 4040F PNEUMOC VAC/ADMIN/RCVD: CPT | Performed by: FAMILY MEDICINE

## 2017-08-30 PROCEDURE — 1123F ACP DISCUSS/DSCN MKR DOCD: CPT | Performed by: FAMILY MEDICINE

## 2017-08-30 PROCEDURE — 90662 IIV NO PRSV INCREASED AG IM: CPT | Performed by: FAMILY MEDICINE

## 2017-08-30 ASSESSMENT — ENCOUNTER SYMPTOMS
CHOKING: 0
EYE DISCHARGE: 0
EYE REDNESS: 0
WHEEZING: 0
CHEST TIGHTNESS: 0
ABDOMINAL PAIN: 0
EYE PAIN: 0
PHOTOPHOBIA: 0
NAUSEA: 0
STRIDOR: 0
APNEA: 0
COLOR CHANGE: 0
DIARRHEA: 0
FACIAL SWELLING: 0
CONSTIPATION: 0

## 2017-09-11 RX ORDER — NYSTATIN 100000 [USP'U]/G
POWDER TOPICAL
Qty: 60 G | Refills: 3 | Status: SHIPPED | OUTPATIENT
Start: 2017-09-11 | End: 2018-06-06 | Stop reason: SDUPTHER

## 2017-09-13 ENCOUNTER — HOSPITAL ENCOUNTER (OUTPATIENT)
Dept: PHARMACY | Age: 82
Setting detail: THERAPIES SERIES
Discharge: HOME | End: 2017-09-13
Payer: MEDICARE

## 2017-09-13 DIAGNOSIS — I48.91 ATRIAL FIBRILLATION, UNSPECIFIED TYPE (HCC): ICD-10-CM

## 2017-09-13 LAB
INR BLD: 2.7
PROTIME: 32.5 SECONDS

## 2017-09-13 PROCEDURE — 85610 PROTHROMBIN TIME: CPT | Performed by: PHARMACIST

## 2017-09-13 PROCEDURE — 99211 OFF/OP EST MAY X REQ PHY/QHP: CPT | Performed by: PHARMACIST

## 2017-09-26 RX ORDER — PANTOPRAZOLE SODIUM 40 MG/1
TABLET, DELAYED RELEASE ORAL
Qty: 30 TABLET | Refills: 10 | Status: SHIPPED | OUTPATIENT
Start: 2017-09-26 | End: 2018-08-08 | Stop reason: SDUPTHER

## 2017-09-27 ENCOUNTER — CARE COORDINATION (OUTPATIENT)
Dept: CARE COORDINATION | Age: 82
End: 2017-09-27

## 2017-10-04 ENCOUNTER — HOSPITAL ENCOUNTER (OUTPATIENT)
Dept: PHARMACY | Age: 82
Setting detail: THERAPIES SERIES
Discharge: HOME | End: 2017-10-04
Payer: MEDICARE

## 2017-10-04 DIAGNOSIS — I48.91 ATRIAL FIBRILLATION, UNSPECIFIED TYPE (HCC): ICD-10-CM

## 2017-10-04 LAB
INR BLD: 3.2
PROTIME: 38.7 SECONDS

## 2017-10-04 PROCEDURE — 99211 OFF/OP EST MAY X REQ PHY/QHP: CPT | Performed by: PHARMACIST

## 2017-10-04 PROCEDURE — 85610 PROTHROMBIN TIME: CPT | Performed by: PHARMACIST

## 2017-10-25 ENCOUNTER — HOSPITAL ENCOUNTER (OUTPATIENT)
Dept: PHARMACY | Age: 82
Setting detail: THERAPIES SERIES
Discharge: HOME | End: 2017-10-25
Payer: MEDICARE

## 2017-10-25 DIAGNOSIS — I48.91 ATRIAL FIBRILLATION, UNSPECIFIED TYPE (HCC): ICD-10-CM

## 2017-10-25 LAB
INR BLD: 2.2
PROTIME: 26.5 SECONDS

## 2017-10-25 PROCEDURE — 99211 OFF/OP EST MAY X REQ PHY/QHP: CPT | Performed by: PHARMACIST

## 2017-10-25 PROCEDURE — 85610 PROTHROMBIN TIME: CPT | Performed by: PHARMACIST

## 2017-10-25 NOTE — PROGRESS NOTES
Mr. Rene Santos is a 81 y.o. y/o male with history of Afib who presents today for anticoagulation monitoring and adjustment.   INR 2.2 is therapeutic for this patient (goal range 2-3) and is reflective of 30 mg TWD  Patient verifies current dosing regimen, patient able to verbally recall dose  Patient reports no missed doses since last INR   Patient denies s/sx clotting and/or stroke  Patient denies hematuria, epistaxis, rectal bleeding  Patient denies changes in diet, alcohol, or tobacco use  Reviewed medication list and drug allergies with patient, updated any medication additions or modifications accordingly  Patient also denies any pending medical or dental procedures scheduled at this time  Patient was instructed to continue 30 mg TWD and RTC 4 weeks

## 2017-10-26 ENCOUNTER — CARE COORDINATION (OUTPATIENT)
Dept: CARE COORDINATION | Age: 82
End: 2017-10-26

## 2017-10-27 RX ORDER — WARFARIN SODIUM 2.5 MG/1
TABLET ORAL
Qty: 180 TABLET | Refills: 0 | Status: SHIPPED | OUTPATIENT
Start: 2017-10-27 | End: 2017-12-27 | Stop reason: SDUPTHER

## 2017-10-27 NOTE — CARE COORDINATION
Call to stefan to discuss health coaching. Spoke with wife who reports he is feeling well and is not available at this time. She will have him return call.

## 2017-11-20 RX ORDER — SIMVASTATIN 20 MG
TABLET ORAL
Qty: 30 TABLET | Refills: 5 | Status: SHIPPED | OUTPATIENT
Start: 2017-11-20 | End: 2018-05-20 | Stop reason: SDUPTHER

## 2017-11-20 RX ORDER — FINASTERIDE 5 MG/1
TABLET, FILM COATED ORAL
Qty: 30 TABLET | Refills: 10 | Status: SHIPPED | OUTPATIENT
Start: 2017-11-20 | End: 2018-11-29 | Stop reason: SDUPTHER

## 2017-11-22 ENCOUNTER — HOSPITAL ENCOUNTER (OUTPATIENT)
Dept: PHARMACY | Age: 82
Setting detail: THERAPIES SERIES
Discharge: HOME | End: 2017-11-22
Payer: MEDICARE

## 2017-11-22 DIAGNOSIS — I48.91 ATRIAL FIBRILLATION, UNSPECIFIED TYPE (HCC): ICD-10-CM

## 2017-11-22 LAB
INR BLD: 3.1
PROTIME: 37.5 SECONDS

## 2017-11-22 PROCEDURE — 85610 PROTHROMBIN TIME: CPT | Performed by: PHARMACIST

## 2017-11-22 PROCEDURE — 99211 OFF/OP EST MAY X REQ PHY/QHP: CPT | Performed by: PHARMACIST

## 2017-11-22 NOTE — PROGRESS NOTES
Mr. Dona Owens is a 81 y.o. y/o male with history of Afib who presents today for anticoagulation monitoring and adjustment.   INR 3.1 is slightly supra-therapeutic for this patient (goal range 2-3) and is reflective of 30 mg TWD  Patient verifies current dosing regimen, patient able to verbally recall dose  Patient reports no missed doses since last INR   Patient denies s/sx clotting and/or stroke  Patient denies hematuria, epistaxis, rectal bleeding  Patient denies changes in diet, alcohol, or tobacco use  Reviewed medication list and drug allergies with patient, updated any medication additions or modifications accordingly  Patient also denies any pending medical or dental procedures scheduled at this time  Patient was instructed to take 2.5 mg dose today then continue 30 mg TWD and RTC 4 weeks

## 2017-11-24 ENCOUNTER — CARE COORDINATION (OUTPATIENT)
Dept: CARE COORDINATION | Age: 82
End: 2017-11-24

## 2017-11-25 NOTE — CARE COORDINATION
Place 0.4 mg under the tongue every 5 minutes as needed for Chest pain Dissolve 1 tablet under tongue for chest pain and repeat every 5 min up to max of 3 total doses. If no relief after 3 doses call 911    Historical Provider, MD   ipratropium-albuterol (DUONEB) 0.5-2.5 (3) MG/3ML SOLN nebulizer solution Inhale 3 mLs into the lungs every 4 hours 7/9/15   Nathalie Casanova, DO   aspirin 81 MG EC tablet Take 81 mg by mouth daily.       Historical Provider, MD       Future Appointments  Date Time Provider Laurence Dobbins   12/20/2017 8:00 AM [address]

## 2017-12-18 RX ORDER — TRIAMTERENE AND HYDROCHLOROTHIAZIDE 37.5; 25 MG/1; MG/1
TABLET ORAL
Qty: 30 TABLET | Refills: 3 | Status: SHIPPED | OUTPATIENT
Start: 2017-12-18 | End: 2018-04-23 | Stop reason: SDUPTHER

## 2017-12-20 ENCOUNTER — HOSPITAL ENCOUNTER (OUTPATIENT)
Dept: PHARMACY | Age: 82
Setting detail: THERAPIES SERIES
Discharge: HOME | End: 2017-12-20
Payer: MEDICARE

## 2017-12-20 ENCOUNTER — CARE COORDINATOR VISIT (OUTPATIENT)
Dept: CARE COORDINATION | Age: 82
End: 2017-12-20

## 2017-12-20 ENCOUNTER — OFFICE VISIT (OUTPATIENT)
Dept: PRIMARY CARE CLINIC | Age: 82
End: 2017-12-20

## 2017-12-20 VITALS
HEIGHT: 68 IN | DIASTOLIC BLOOD PRESSURE: 62 MMHG | SYSTOLIC BLOOD PRESSURE: 100 MMHG | TEMPERATURE: 98 F | RESPIRATION RATE: 14 BRPM | BODY MASS INDEX: 31.52 KG/M2 | HEART RATE: 68 BPM | WEIGHT: 208 LBS

## 2017-12-20 DIAGNOSIS — E11.8 TYPE 2 DIABETES MELLITUS WITH COMPLICATION, WITHOUT LONG-TERM CURRENT USE OF INSULIN (HCC): ICD-10-CM

## 2017-12-20 DIAGNOSIS — R42 DIZZINESS: ICD-10-CM

## 2017-12-20 DIAGNOSIS — M54.2 NECK PAIN: Primary | ICD-10-CM

## 2017-12-20 DIAGNOSIS — G45.0 VERTEBROBASILAR INSUFFICIENCY: ICD-10-CM

## 2017-12-20 DIAGNOSIS — I48.91 ATRIAL FIBRILLATION, UNSPECIFIED TYPE (HCC): ICD-10-CM

## 2017-12-20 LAB
INR BLD: 3.5
PROTIME: 42.4 SECONDS

## 2017-12-20 PROCEDURE — 1036F TOBACCO NON-USER: CPT | Performed by: FAMILY MEDICINE

## 2017-12-20 PROCEDURE — 85610 PROTHROMBIN TIME: CPT

## 2017-12-20 PROCEDURE — G8427 DOCREV CUR MEDS BY ELIG CLIN: HCPCS | Performed by: FAMILY MEDICINE

## 2017-12-20 PROCEDURE — G8484 FLU IMMUNIZE NO ADMIN: HCPCS | Performed by: FAMILY MEDICINE

## 2017-12-20 PROCEDURE — 4040F PNEUMOC VAC/ADMIN/RCVD: CPT | Performed by: FAMILY MEDICINE

## 2017-12-20 PROCEDURE — 99211 OFF/OP EST MAY X REQ PHY/QHP: CPT

## 2017-12-20 PROCEDURE — G8598 ASA/ANTIPLAT THER USED: HCPCS | Performed by: FAMILY MEDICINE

## 2017-12-20 PROCEDURE — 1123F ACP DISCUSS/DSCN MKR DOCD: CPT | Performed by: FAMILY MEDICINE

## 2017-12-20 PROCEDURE — 99213 OFFICE O/P EST LOW 20 MIN: CPT | Performed by: FAMILY MEDICINE

## 2017-12-20 PROCEDURE — G8417 CALC BMI ABV UP PARAM F/U: HCPCS | Performed by: FAMILY MEDICINE

## 2017-12-20 ASSESSMENT — ENCOUNTER SYMPTOMS
BLOOD IN STOOL: 0
EYES NEGATIVE: 1
ABDOMINAL PAIN: 0
VISUAL CHANGE: 1
CHANGE IN BOWEL HABIT: 0
COUGH: 0
GASTROINTESTINAL NEGATIVE: 1
PHOTOPHOBIA: 0
EYE DISCHARGE: 0
DIARRHEA: 0
VOMITING: 0
RESPIRATORY NEGATIVE: 1
SWOLLEN GLANDS: 0
NAUSEA: 0
SHORTNESS OF BREATH: 0
APNEA: 0
CONSTIPATION: 0
SORE THROAT: 0
CHEST TIGHTNESS: 0
BACK PAIN: 0

## 2017-12-20 NOTE — PROGRESS NOTES
Subjective:      Patient ID: Linette Peralta is a 81 y.o. male who presents today for:  Chief Complaint   Patient presents with    Dizziness     Pt is here for a hospital f/u from 12/16/17 [address] [place] [address]. He was diagnosed with dizziness and giddiness. Pt was in office with  Terence Parada for Protime INR and Anticoagulation tests. Pt noted he is to cut back on Coumadin from 2.5 mg to half tablet or 1.25 mg since he had a 3.5     Patient is here today for f/u on hospital visit to [address] [place] [address] on 12/16/17. He states suddenly he became dizzy, weak and had intense sweats. He was concerned with possibility of a stroke and states he requested his wife call an ambulance. Testing at ER showed no stroke. Patient states his diabetes is stable and his glucose ranges are consistently in the normal range. He does c/o intermittent neck pain that shoots into his head. Dizziness   This is a new problem. The current episode started in the past 7 days. The problem has been resolved. Associated symptoms include diaphoresis (since resolved), headaches (that start at shoulders), neck pain, a visual change (since resolved) and weakness (since resolved). Pertinent negatives include no abdominal pain, anorexia, arthralgias, change in bowel habit, chest pain, chills, congestion, coughing, fatigue, fever, joint swelling, myalgias, nausea, numbness, rash, sore throat, swollen glands, urinary symptoms, vertigo or vomiting. Past Medical History:   Diagnosis Date    ?  Prostatitis 3/1/2013    A-fib ([address] 6/5/2015    Anterolisthesis, L-4 on L-5, significant 4/17/2014    Aortic stenosis 9/12    [address] [facility] [facility]    Arm mass 4/15/2014    Arm mass 4/15/2014    Auditory hallucinations, recent Narcotics from Dentist 4/8/2017    B12 deficiency 2/11/2014    BCC (basal cell carcinoma), face 8/30/2017    BPH (benign prostatic hyperplasia) 5/15/2012    Bronchitis 7/22/2014    CAD (coronary artery disease)     Cardiac murmur 11/26/2013    Diverticulosis 09/07/2016     [facility] 5/15/2012    Ecchymosis- L foot dorsum 12/10/2015    Fall (on) incline, sequela, Velcro shoes stuck together 5/31/2016    Hematuria 3/1/2013    Hyperlipidemia     Hypertension     LBP (low back pain) 8/13/2012    Mesothelioma [facility] 2004    Noncompliance, w/ GI prior 4/21/2014    Orthostatic hypotension 7/9/2015    Osteoarthritis     Peripheral neuropathy ([facility]     Polyp of colon 09/07/2016    DR. WALDROP    Pyuria 3/1/2013    R/O Vertebrobasilar insufficiency 12/20/2017    Rectal bleeding 4/21/2014    Rib lesion 2004    - PET    Right hip pain 4/15/2014    Screening for prostate cancer 5/15/2012    Skin lesion of neck, ? SCCA 7/22/2014    Squamous cell carcinoma in situ of skin, L neck 5/5/4791    Systolic murmur 8/3/5173    Tinea cruris 8/13/2012    Tinnitus 12/10/2013    Type II or unspecified type diabetes mellitus without mention of complication, not stated as uncontrolled     Unintended weight loss 7/23/2015    UTI (urinary tract infection) 3/1/2013    Valvular heart disease 6/26/2015    Wheezy bronchitis 7/22/2014     Past Surgical History:   Procedure Laterality Date    ANGIOPLASTY  1996    BACK SURGERY  12/2002   [facility] CARDIAC VALVE SURGERY  3/24/15    Venkata Colon CATARACT REMOVAL  2010    X2    COLONOSCOPY  09/07/2016    DR. WALDROP    CORONARY ANGIOPLASTY WITH STENT PLACEMENT  11/2007    X2    CORONARY ARTERY BYPASS GRAFT  3/24/15    Dr Hastings Colon POLYPECTOMY  09/2016    7 colon polyps removed    TOTAL ANKLE ARTHROPLASTY  5/25/11    R    TOTAL KNEE ARTHROPLASTY  1998    LEFT     Family History   Problem Relation Age of Onset    High Blood Pressure Father      Social History     Social History    Marital status:      Spouse name: N/A    Number of children: N/A    Years of education: N/A     Occupational History    Not on file.      Social History Main Topics    Smoking status: Never Smoker    Smokeless tobacco: Never Used scleral icterus. Neck: Normal range of motion. Neck supple. No tracheal deviation present. No thyromegaly present. Cardiovascular: Normal rate, regular rhythm, normal heart sounds and intact distal pulses. Exam reveals no gallop and no friction rub. No murmur heard. Pulmonary/Chest: Effort normal and breath sounds normal. No respiratory distress. He has no wheezes. He has no rales. He exhibits no tenderness. Lymphadenopathy:     He has no cervical adenopathy. Neurological: He is alert and oriented to person, place, and time. Coordination normal.   Skin: Skin is warm and dry. He is not diaphoretic. Psychiatric: He has a normal mood and affect. His behavior is normal. Judgment and thought content normal.   Nursing note and vitals reviewed. Assessment:     1. Neck pain  XR CERVICAL SPINE (4-5 VIEWS)   2. Dizziness     3. Type 2 diabetes mellitus with complication, without long-term current use of insulin ([address]     4. R/O Vertebrobasilar insufficiency         Plan:      Orders Placed This Encounter   Procedures    XR CERVICAL SPINE (4-5 VIEWS)     Standing Status:   Future     Standing Expiration Date:   12/20/2018     No orders of the defined types were placed in this encounter. Controlled Substances Monitoring:      Return in about 1 week (around 12/27/2017) for F/U neck pain, dizziness. I, Nicol Montoya CMA   , am scribing for and in the presence of [place] [employer], DO. Electronically signed by :  Nicol Porter DO, personally performed the services described in this documentation, as scribed by Lorraine Mendoza CMA   in my presence, and it is both accurate and complete.  Electronically signed by: Lisbeth Alberts DO    12/20/17 3:39 PM    Lisbeth Alberts DO

## 2017-12-23 NOTE — CARE COORDINATION
Ambulatory Care Coordination Note  12/20/2017  CM Risk Score: 3  Mikie Mortality Risk Score: 33.32    ACC: Sveta Lucas RN    Summary Note: met with stefan due to recent hospital stay. He reports he had sudden visual changes and weakness. He is now co neck pain. His blood sugar has been stable with no s/s hypoglycemia  Diabetes Assessment    Medic Alert ID:  No  Meal Planning:  Avoidance of concentrated sweets   How often do you test your blood sugar?:  No Testing   Do you have barriers with adherence to non-pharmacologic self-management interventions? (Nutrition/Exercise/Self-Monitoring):  No   Have you ever had to go to the ED for symptoms of low blood sugar?:  No       No patient-reported symptoms       and   COPD Assessment    Does the patient understand envrionmental exposure?:  Yes  Is the patient able to verbalize Rescue vs. Long Acting medications?:  No  Does the patient have a nebulizer?:  Yes  Does the patient use a space with inhaled medications?:  No     No patient-reported symptoms         Symptoms:      Have you had a recent diagnosis of pneumonia either by PCP or at a hospital?:  No             Care Coordination Interventions    Program Enrollment:  Rising Risk  Referral from Primary Care Provider:  No  Suggested Interventions and Community Resources  Fall Risk Prevention:  Completed  Zone Management Tools: In Process         Goals Addressed             Most Recent     Conditions and Symptoms   Worsening (12/20/2017)             I will follow my Zone Management tool to seek urgent or emergent care. I will notify my provider of any symptoms that indicate a worsening of my condition. Barriers: impairment:  cognitive  Plan for overcoming my barriers: CC will stay in contact and discuss health every 2 weeks  Confidence: 6/10  Anticipated Goal Completion Date: 2/28/2018              Prior to Admission medications    Medication Sig Start Date End Date Taking?  Authorizing Provider

## 2017-12-26 NOTE — PATIENT INSTRUCTIONS
glomerular filtration rate. A high level of creatinine and/or a low eGFR may mean your kidneys are not working as well as they should. · How often: Once a year  · Goal: Normal level of creatinine in the blood. The eGFR goal is greater than 60 mL/min/1.73 m². Complete foot exam: The doctor checks for foot sores and whether any sensation has been lost.  · How often: Once a year  · Goal: Healthy feet with no foot ulcers or loss of feeling  Dental exam and cleaning: The dentist checks for gum disease and tooth decay. People with high blood sugar are more likely to have these problems. · How often: Every 6 months  · Goal: Healthy teeth and gums  Complete eye exam: High blood sugar levels can damage the eyes. This exam is done by an ophthalmologist or optometrist. It includes a dilated eye exam. The exam shows whether there's damage to the back of the eye (diabetic retinopathy). · How often: Once a year. If you don't have any signs of diabetic retinopathy, your doctor may recommend an exam every 2 years. · Goal: No damage to the back of the eye  Thyroid-stimulating hormone (TSH) blood test: This test checks for thyroid disease. Too little thyroid hormone can cause some medicines (like insulin) to stay in the body longer. This can cause low blood sugar. You may be tested if you have high cholesterol or are a woman over 48years old. · How often: As part of your diabetes diagnosis, and as often as your doctor recommends after that  · Goal: Normal level of TSH in the blood  Follow-up care is a key part of your treatment and safety. Be sure to make and go to all appointments, and call your doctor if you are having problems. It's also a good idea to know your test results and keep a list of the medicines you take. Where can you learn more? Go to https://chviviewlloyd.Talkbits. org and sign in to your [employer] account.  Enter [id number] in the [facility] box to learn more about \"Learning About Tests When You They also do not protect your feet from hot pavement or cold weather. · Have your doctor check your feet during each visit. If you have a foot problem, see your doctor. Do not try to treat an early foot problem at home. Home remedies or treatments that you can buy without a prescription (such as corn removers) can be harmful. · Always get early treatment for foot problems. A minor irritation can lead to a major problem if not properly cared for early. When should you call for help? Call your doctor now or seek immediate medical care if:  ? · You have a foot sore, an ulcer or break in the skin that is not healing after 4 days, bleeding corns or calluses, or an ingrown toenail. ? · You have blue or black areas, which can mean bruising or blood flow problems. ? · You have peeling skin or tiny blisters between your toes or cracking or oozing of the skin. ? · You have a fever for more than 24 hours and a foot sore. ? · You have new numbness or tingling in your feet that does not go away after you move your feet or change positions. ? · You have unexplained or unusual swelling of the foot or ankle. ? Watch closely for changes in your health, and be sure to contact your doctor if:  ? · You cannot do proper foot care. Where can you learn more? Go to https://Rocket Lawyerpebradyeb.[email or web address] [email or web address] org and sign in to your [employer] account. Enter N939 in the [facility] box to learn more about \"Diabetes Foot Health: Care Instructions. \"     If you do not have an account, please click on the \"Sign Up Now\" link. Current as of: March 13, 2017  Content Version: 11.4  © 4772-3656 [employer]. Care instructions adapted under license by [facility] If you have questions about a medical condition or this instruction, always ask your healthcare professional. [address] any warranty or liability for your use of this information.

## 2017-12-27 ENCOUNTER — OFFICE VISIT (OUTPATIENT)
Dept: PRIMARY CARE CLINIC | Age: 82
End: 2017-12-27

## 2017-12-27 VITALS
BODY MASS INDEX: 32.13 KG/M2 | DIASTOLIC BLOOD PRESSURE: 52 MMHG | WEIGHT: 212 LBS | HEIGHT: 68 IN | RESPIRATION RATE: 14 BRPM | HEART RATE: 56 BPM | SYSTOLIC BLOOD PRESSURE: 116 MMHG | TEMPERATURE: 96.6 F

## 2017-12-27 DIAGNOSIS — I10 ESSENTIAL HYPERTENSION: ICD-10-CM

## 2017-12-27 DIAGNOSIS — G47.30 SLEEP APNEA, UNSPECIFIED TYPE: ICD-10-CM

## 2017-12-27 DIAGNOSIS — M47.812 CERVICAL SPONDYLOSIS WITHOUT MYELOPATHY: Primary | ICD-10-CM

## 2017-12-27 DIAGNOSIS — R09.02 HYPOXIA: ICD-10-CM

## 2017-12-27 PROCEDURE — G8598 ASA/ANTIPLAT THER USED: HCPCS | Performed by: FAMILY MEDICINE

## 2017-12-27 PROCEDURE — 4040F PNEUMOC VAC/ADMIN/RCVD: CPT | Performed by: FAMILY MEDICINE

## 2017-12-27 PROCEDURE — G8484 FLU IMMUNIZE NO ADMIN: HCPCS | Performed by: FAMILY MEDICINE

## 2017-12-27 PROCEDURE — 1123F ACP DISCUSS/DSCN MKR DOCD: CPT | Performed by: FAMILY MEDICINE

## 2017-12-27 PROCEDURE — 99214 OFFICE O/P EST MOD 30 MIN: CPT | Performed by: FAMILY MEDICINE

## 2017-12-27 PROCEDURE — G8427 DOCREV CUR MEDS BY ELIG CLIN: HCPCS | Performed by: FAMILY MEDICINE

## 2017-12-27 PROCEDURE — 1036F TOBACCO NON-USER: CPT | Performed by: FAMILY MEDICINE

## 2017-12-27 PROCEDURE — G8417 CALC BMI ABV UP PARAM F/U: HCPCS | Performed by: FAMILY MEDICINE

## 2017-12-27 RX ORDER — IPRATROPIUM BROMIDE AND ALBUTEROL SULFATE 2.5; .5 MG/3ML; MG/3ML
1 SOLUTION RESPIRATORY (INHALATION) EVERY 4 HOURS
Qty: 360 ML | Refills: 3 | Status: SHIPPED | OUTPATIENT
Start: 2017-12-27 | End: 2018-11-21 | Stop reason: SDUPTHER

## 2017-12-27 RX ORDER — CYCLOBENZAPRINE HCL 5 MG
5 TABLET ORAL NIGHTLY PRN
Qty: 30 TABLET | Refills: 3 | Status: SHIPPED | OUTPATIENT
Start: 2017-12-27 | End: 2018-01-06

## 2017-12-27 ASSESSMENT — ENCOUNTER SYMPTOMS
CONSTIPATION: 0
CHEST TIGHTNESS: 0
TROUBLE SWALLOWING: 0
COLOR CHANGE: 0
EYE REDNESS: 0
CHOKING: 0
DIARRHEA: 0
STRIDOR: 0
EYE DISCHARGE: 0
FACIAL SWELLING: 0
EYE PAIN: 0
VISUAL CHANGE: 0
NAUSEA: 0
APNEA: 0
WHEEZING: 0
PHOTOPHOBIA: 0
ABDOMINAL PAIN: 0

## 2017-12-27 NOTE — PROGRESS NOTES
Subjective:      Patient ID: Toy Swenson is a 81 y.o. male who presents today for:  Chief Complaint   Patient presents with    Neck Pain     Patient c/o neck pain that's been going on for a while and is 6/10. Neck Pain    This is a new problem. The current episode started 1 to 4 weeks ago. The problem occurs constantly. The problem has been waxing and waning. The pain is associated with an unknown factor. The pain is present in the anterior neck. The quality of the pain is described as aching. The pain is at a severity of 6/10. The pain is moderate. Nothing aggravates the symptoms. Pertinent negatives include no chest pain, fever, headaches, leg pain, numbness, pain with swallowing, paresis, photophobia, syncope, tingling, trouble swallowing, visual change, weakness or weight loss. He has tried nothing for the symptoms. Patient had Xrays done on 12/20/17 that show Multilevel degenerative changes in the cervical spine most significantly from C4 to C6. Patient has been unable to follow up with Dr. Emilie Mcdermott for sleep apnea. He states even after wearing CPAP he wakes not feeling rested and SOB. He states that he breathes more through his mouth than through his nose. Past Medical History:   Diagnosis Date    ? Prostatitis 3/1/2013    A-fib ([facility] 6/5/2015    Anterolisthesis, L-4 on L-5, significant 4/17/2014    Aortic stenosis 9/12    [address] [facility]    Arm mass 4/15/2014    Arm mass 4/15/2014    Auditory hallucinations, recent Narcotics from Dentist 4/8/2017    B12 deficiency 2/11/2014    BCC (basal cell carcinoma), face 8/30/2017    BPH (benign prostatic hyperplasia) 5/15/2012    Bronchitis 7/22/2014    CAD (coronary artery disease)     Cardiac murmur 11/26/2013    Diverticulosis 09/07/2016    DR. WALDROP    DM (diabetes mellitus) ([facility] 5/15/2012    Ecchymosis- L foot dorsum 12/10/2015    Fall (on) incline, sequela, Velcro shoes stuck together 5/31/2016    Hematuria 3/1/2013    Hyperlipidemia     Hypertension     LBP (low back pain) 8/13/2012    Mesothelioma [facility]) 2004    Noncompliance, w/ GI prior 4/21/2014    Orthostatic hypotension 7/9/2015    Osteoarthritis     Peripheral neuropathy ([address]     Polyp of colon 09/07/2016    DR. WALDROP    Pyuria 3/1/2013    R/O Vertebrobasilar insufficiency 12/20/2017    Rectal bleeding 4/21/2014    Rib lesion 2004    - PET    Right hip pain 4/15/2014    Screening for prostate cancer 5/15/2012    Skin lesion of neck, ? SCCA 7/22/2014    Squamous cell carcinoma in situ of skin, L neck 1/6/1224    Systolic murmur 9/5/7058    Tinea cruris 8/13/2012    Tinnitus 12/10/2013    Type II or unspecified type diabetes mellitus without mention of complication, not stated as uncontrolled     Unintended weight loss 7/23/2015    UTI (urinary tract infection) 3/1/2013    Valvular heart disease 6/26/2015    Wheezy bronchitis 7/22/2014     Past Surgical History:   Procedure Laterality Date    ANGIOPLASTY  1996    BACK SURGERY  12/2002   Tai[facility] Vazquez CARDIAC VALVE SURGERY  3/24/15    Wilian Davis CATARACT REMOVAL  2010    X2    COLONOSCOPY  09/07/2016    DR. WALDROP    CORONARY ANGIOPLASTY WITH STENT PLACEMENT  11/2007    X2    CORONARY ARTERY BYPASS GRAFT  3/24/15    Dr Wilian Davis POLYPECTOMY  09/2016    7 colon polyps removed    TOTAL ANKLE ARTHROPLASTY  5/25/11    R    TOTAL KNEE ARTHROPLASTY  1998    LEFT     Family History   Problem Relation Age of Onset    High Blood Pressure Father      Social History     Social History    Marital status:      Spouse name: N/A    Number of children: N/A    Years of education: N/A     Occupational History    Not on file.      Social History Main Topics    Smoking status: Never Smoker    Smokeless tobacco: Never Used    Alcohol use No    Drug use: No    Sexual activity: Not on file     Other Topics Concern    Not on file     Social History Narrative    No narrative on file     Allergies: Normal rate, regular rhythm, normal heart sounds and intact distal pulses. Exam reveals no gallop and no friction rub. No murmur heard. Pulmonary/Chest: Effort normal and breath sounds normal. No respiratory distress. He has no wheezes. He has no rales. He exhibits no tenderness. Musculoskeletal:        Cervical back: He exhibits decreased range of motion, tenderness and spasm. Lymphadenopathy:     He has no cervical adenopathy. Neurological: He is alert and oriented to person, place, and time. Skin: Skin is warm and dry. Psychiatric: He has a normal mood and affect. His behavior is normal. Judgment and thought content normal.   Nursing note and vitals reviewed. Assessment:     1. Cervical spondylosis without myelopathy  cyclobenzaprine (FLEXERIL) 5 MG tablet   2. Sleep apnea, unspecified type     3. Hypoxia     4. Essential hypertension         Plan:      No orders of the defined types were placed in this encounter. Orders Placed This Encounter   Medications    ipratropium-albuterol (DUONEB) 0.5-2.5 (3) MG/3ML SOLN nebulizer solution     Sig: Inhale 3 mLs into the lungs every 4 hours     Dispense:  360 mL     Refill:  3    cyclobenzaprine (FLEXERIL) 5 MG tablet     Sig: Take 1 tablet by mouth nightly as needed for Muscle spasms     Dispense:  30 tablet     Refill:  3       Controlled Substances Monitoring:      No Follow-up on file. I, Aniyah Montoya CMA   , am scribing for and in the presence of [employer], DO. Electronically signed by :  Aniyah Kenney DO, personally performed the services described in this documentation, as scribed by Geri Valentin CMA   in my presence, and it is both accurate and complete.  Electronically signed by: [place] Sullivan LifeDO    12/27/17 8:28 AM    qLearning DO [place]

## 2017-12-28 ENCOUNTER — TELEPHONE (OUTPATIENT)
Dept: PRIMARY CARE CLINIC | Age: 82
End: 2017-12-28

## 2018-01-03 ENCOUNTER — CARE COORDINATION (OUTPATIENT)
Dept: CARE COORDINATION | Age: 83
End: 2018-01-03

## 2018-01-09 ENCOUNTER — TELEPHONE (OUTPATIENT)
Dept: PHARMACY | Age: 83
End: 2018-01-09

## 2018-01-09 NOTE — TELEPHONE ENCOUNTER
Pt called to cancel appt. Pt just got out of hospital and is not able to reschedule at this time per Becka. Will call when he feels better.   [address] [facility]  Staff Pharmacist  1/9/2018  10:39 AM

## 2018-01-10 ENCOUNTER — APPOINTMENT (OUTPATIENT)
Dept: PHARMACY | Age: 83
End: 2018-01-10
Payer: MEDICARE

## 2018-01-15 ENCOUNTER — TELEPHONE (OUTPATIENT)
Dept: PHARMACY | Age: 83
End: 2018-01-15

## 2018-01-15 DIAGNOSIS — I48.91 ATRIAL FIBRILLATION, UNSPECIFIED TYPE (HCC): ICD-10-CM

## 2018-01-16 ENCOUNTER — CARE COORDINATION (OUTPATIENT)
Dept: CARE COORDINATION | Age: 83
End: 2018-01-16

## 2018-01-17 ENCOUNTER — OFFICE VISIT (OUTPATIENT)
Dept: PRIMARY CARE CLINIC | Age: 83
End: 2018-01-17

## 2018-01-17 ENCOUNTER — HOSPITAL ENCOUNTER (OUTPATIENT)
Dept: PHARMACY | Age: 83
Setting detail: THERAPIES SERIES
Discharge: HOME | End: 2018-01-17
Payer: MEDICARE

## 2018-01-17 VITALS
HEART RATE: 56 BPM | RESPIRATION RATE: 20 BRPM | TEMPERATURE: 97.9 F | WEIGHT: 212 LBS | HEIGHT: 68 IN | DIASTOLIC BLOOD PRESSURE: 50 MMHG | BODY MASS INDEX: 32.13 KG/M2 | OXYGEN SATURATION: 91 % | SYSTOLIC BLOOD PRESSURE: 92 MMHG

## 2018-01-17 DIAGNOSIS — R09.02 HYPOXIA: ICD-10-CM

## 2018-01-17 DIAGNOSIS — F33.8 SEASONAL AFFECTIVE DISORDER (HCC): ICD-10-CM

## 2018-01-17 DIAGNOSIS — R44.0 AUDITORY HALLUCINATIONS: ICD-10-CM

## 2018-01-17 DIAGNOSIS — M47.812 CERVICAL SPONDYLOSIS WITHOUT MYELOPATHY: ICD-10-CM

## 2018-01-17 DIAGNOSIS — F32.4 DEPRESSION, MAJOR, SINGLE EPISODE, IN PARTIAL REMISSION (HCC): ICD-10-CM

## 2018-01-17 DIAGNOSIS — F32.A DEPRESSION, UNSPECIFIED DEPRESSION TYPE: Primary | ICD-10-CM

## 2018-01-17 DIAGNOSIS — E13.49: ICD-10-CM

## 2018-01-17 DIAGNOSIS — G62.9 ACQUIRED POLYNEUROPATHY: ICD-10-CM

## 2018-01-17 DIAGNOSIS — I48.91 ATRIAL FIBRILLATION, UNSPECIFIED TYPE (HCC): ICD-10-CM

## 2018-01-17 DIAGNOSIS — J44.9 OBSTRUCTIVE CHRONIC BRONCHITIS WITHOUT EXACERBATION (HCC): ICD-10-CM

## 2018-01-17 PROBLEM — G89.29 CHRONIC PAIN: Status: ACTIVE | Noted: 2018-01-17

## 2018-01-17 LAB
INR BLD: 3.7
PROTIME: 44.9 SECONDS

## 2018-01-17 PROCEDURE — 4040F PNEUMOC VAC/ADMIN/RCVD: CPT | Performed by: FAMILY MEDICINE

## 2018-01-17 PROCEDURE — 99211 OFF/OP EST MAY X REQ PHY/QHP: CPT

## 2018-01-17 PROCEDURE — G8417 CALC BMI ABV UP PARAM F/U: HCPCS | Performed by: FAMILY MEDICINE

## 2018-01-17 PROCEDURE — G8926 SPIRO NO PERF OR DOC: HCPCS | Performed by: FAMILY MEDICINE

## 2018-01-17 PROCEDURE — G8484 FLU IMMUNIZE NO ADMIN: HCPCS | Performed by: FAMILY MEDICINE

## 2018-01-17 PROCEDURE — G8427 DOCREV CUR MEDS BY ELIG CLIN: HCPCS | Performed by: FAMILY MEDICINE

## 2018-01-17 PROCEDURE — 99214 OFFICE O/P EST MOD 30 MIN: CPT | Performed by: FAMILY MEDICINE

## 2018-01-17 PROCEDURE — 85610 PROTHROMBIN TIME: CPT

## 2018-01-17 PROCEDURE — 1123F ACP DISCUSS/DSCN MKR DOCD: CPT | Performed by: FAMILY MEDICINE

## 2018-01-17 PROCEDURE — 3023F SPIROM DOC REV: CPT | Performed by: FAMILY MEDICINE

## 2018-01-17 PROCEDURE — G8598 ASA/ANTIPLAT THER USED: HCPCS | Performed by: FAMILY MEDICINE

## 2018-01-17 PROCEDURE — 1036F TOBACCO NON-USER: CPT | Performed by: FAMILY MEDICINE

## 2018-01-17 RX ORDER — METAXALONE 400 MG/1
400 TABLET ORAL 3 TIMES DAILY
Qty: 30 TABLET | Refills: 3 | Status: SHIPPED | OUTPATIENT
Start: 2018-01-17 | End: 2018-07-05 | Stop reason: SDUPTHER

## 2018-01-17 RX ORDER — PAROXETINE HYDROCHLORIDE 20 MG/1
20 TABLET, FILM COATED ORAL DAILY
Qty: 30 TABLET | Refills: 3 | Status: SHIPPED | OUTPATIENT
Start: 2018-01-17 | End: 2018-12-26 | Stop reason: SDUPTHER

## 2018-01-17 ASSESSMENT — ENCOUNTER SYMPTOMS
CONSTIPATION: 0
WHEEZING: 0
PHOTOPHOBIA: 0
APNEA: 0
COLOR CHANGE: 0
NAUSEA: 0
TROUBLE SWALLOWING: 0
DIARRHEA: 0
CHEST TIGHTNESS: 0
ABDOMINAL PAIN: 0
EYE DISCHARGE: 0
EYE PAIN: 0
FACIAL SWELLING: 0
EYE REDNESS: 0
STRIDOR: 0
VISUAL CHANGE: 1
CHOKING: 0
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
## 2018-01-17 NOTE — CARE COORDINATION
Medication Sig Start Date End Date Taking? Authorizing Provider   ipratropium-albuterol (DUONEB) 0.5-2.5 (3) MG/3ML SOLN nebulizer solution Inhale 3 mLs into the lungs every 4 hours 12/27/17   Tim Baird DO   triamterene-hydrochlorothiazide (MAXZIDE-25) 37.5-25 MG per tablet TAKE 1 TABLET BY MOUTH DAILY 12/18/17   Julian Olvera MD   PARoxetine (PAXIL) 10 MG tablet Take 1 tablet by mouth daily 11/21/17   Vivienne Casanova, DO   finasteride (PROSCAR) 5 MG tablet TAKE 1 TABLET BY MOUTH DAILY. 11/20/17   Vivienne Casanova DO   simvastatin (ZOCOR) 20 MG tablet TAKE 1 TABLET BY MOUTH DAILY. 11/20/17   Tim Baird DO   metFORMIN (GLUCOPHAGE) 1000 MG tablet TAKE 1 TABLET BY MOUTH DAILY 11/6/17   Tim Baird DO   diazepam (VALIUM) 5 MG tablet TAKE 1 TABLET EVERY 8 HOURS AS NEEDED FOR ANXIETY OR SLEEP 11/2/17   Julian Olvera MD   pantoprazole (PROTONIX) 40 MG tablet TAKE 1 TABLET BY MOUTH DAILY 9/26/17   Vivienne Casanova DO   NYSTATIN 680771 UNIT/GM powder APPLY TOPICALLY 4 TIMES DAILY. 9/11/17   Vivienne Casanova, DO   glucose blood VI test strips (ONETOUCH VERIO) strip 1 each by In Vitro route daily As needed. DX: E11.8 8/23/17   Tim Baird DO   ONETOUCH DELICA LANCETS FINE MISC Once daily 8/16/17   Tim Baird DO   buPROPion (WELLBUTRIN XL) 150 MG extended release tablet Take 1 tablet by mouth every morning 8/15/17 8/15/18  Tim Baird DO   ascorbic acid ([employer] VITAMIN C) 1000 MG tablet Take 1,000 mg by mouth 2 times daily    Historical Provider, MD   ADVAIR [facility] 115-21 MCG/ACT inhaler INHALE 2 PUFFS INTO THE LUNGS EVERY 12 HOURS RINSE MOUTH AFTER USE 4/11/17   Tim Baird DO   warfarin (COUMADIN) 2.5 MG tablet Take 2 tablets by mouth daily, except Tuesdays & Thursdays take only 1 tablet.      Historical Provider, MD   magnesium oxide (MAG-OX) 400 MG tablet Take 400 mg by mouth daily     Historical Provider, MD   nitroGLYCERIN (NITROSTAT) 0.4 MG SL tablet Place 0.4 mg under the tongue every 5 minutes as needed for Chest pain Dissolve 1 tablet under tongue for chest pain and repeat every 5 min up to max of 3 total doses. If no relief after 3 doses call 911    Historical Provider, MD   aspirin 81 MG EC tablet Take 81 mg by mouth daily.       Historical Provider, MD       Future Appointments  Date Time Provider Laurence Dobbins   1/17/2018 8:00 AM DO GARY [facility] EMERGENCY [facility] AT [place]   1/17/2018 9:30 AM [address]

## 2018-01-24 RX ORDER — WARFARIN SODIUM 2.5 MG/1
TABLET ORAL
Qty: 180 TABLET | Refills: 0 | Status: SHIPPED | OUTPATIENT
Start: 2018-01-24 | End: 2018-04-22 | Stop reason: SDUPTHER

## 2018-01-25 ENCOUNTER — OFFICE VISIT (OUTPATIENT)
Dept: PRIMARY CARE CLINIC | Age: 83
End: 2018-01-25
Payer: MEDICARE

## 2018-01-25 ENCOUNTER — CARE COORDINATOR VISIT (OUTPATIENT)
Dept: CARE COORDINATION | Age: 83
End: 2018-01-25

## 2018-01-25 ENCOUNTER — OFFICE VISIT (OUTPATIENT)
Dept: BEHAVIORAL/MENTAL HEALTH CLINIC | Age: 83
End: 2018-01-25
Payer: MEDICARE

## 2018-01-25 VITALS
TEMPERATURE: 97.7 F | BODY MASS INDEX: 31.27 KG/M2 | HEIGHT: 68 IN | WEIGHT: 206.31 LBS | RESPIRATION RATE: 14 BRPM | HEART RATE: 50 BPM | DIASTOLIC BLOOD PRESSURE: 60 MMHG | SYSTOLIC BLOOD PRESSURE: 100 MMHG

## 2018-01-25 DIAGNOSIS — I10 ESSENTIAL HYPERTENSION: ICD-10-CM

## 2018-01-25 DIAGNOSIS — E11.8 TYPE 2 DIABETES MELLITUS WITH COMPLICATION, WITHOUT LONG-TERM CURRENT USE OF INSULIN (HCC): ICD-10-CM

## 2018-01-25 DIAGNOSIS — F32.A DEPRESSION, UNSPECIFIED DEPRESSION TYPE: Primary | ICD-10-CM

## 2018-01-25 DIAGNOSIS — F32.1 MODERATE SINGLE CURRENT EPISODE OF MAJOR DEPRESSIVE DISORDER (HCC): Primary | ICD-10-CM

## 2018-01-25 DIAGNOSIS — F33.8 SEASONAL AFFECTIVE DISORDER (HCC): ICD-10-CM

## 2018-01-25 DIAGNOSIS — J44.9 CHRONIC OBSTRUCTIVE PULMONARY DISEASE, UNSPECIFIED COPD TYPE (HCC): ICD-10-CM

## 2018-01-25 LAB
GLUCOSE BLD-MCNC: 182 MG/DL
HBA1C MFR BLD: 5.7 %

## 2018-01-25 PROCEDURE — 90791 PSYCH DIAGNOSTIC EVALUATION: CPT | Performed by: PSYCHOLOGIST

## 2018-01-25 PROCEDURE — G8598 ASA/ANTIPLAT THER USED: HCPCS | Performed by: FAMILY MEDICINE

## 2018-01-25 PROCEDURE — G8427 DOCREV CUR MEDS BY ELIG CLIN: HCPCS | Performed by: FAMILY MEDICINE

## 2018-01-25 PROCEDURE — G8484 FLU IMMUNIZE NO ADMIN: HCPCS | Performed by: FAMILY MEDICINE

## 2018-01-25 PROCEDURE — 99214 OFFICE O/P EST MOD 30 MIN: CPT | Performed by: FAMILY MEDICINE

## 2018-01-25 PROCEDURE — G8926 SPIRO NO PERF OR DOC: HCPCS | Performed by: FAMILY MEDICINE

## 2018-01-25 PROCEDURE — 4040F PNEUMOC VAC/ADMIN/RCVD: CPT | Performed by: FAMILY MEDICINE

## 2018-01-25 PROCEDURE — 1036F TOBACCO NON-USER: CPT | Performed by: FAMILY MEDICINE

## 2018-01-25 PROCEDURE — 3023F SPIROM DOC REV: CPT | Performed by: FAMILY MEDICINE

## 2018-01-25 PROCEDURE — 82962 GLUCOSE BLOOD TEST: CPT | Performed by: FAMILY MEDICINE

## 2018-01-25 PROCEDURE — 1123F ACP DISCUSS/DSCN MKR DOCD: CPT | Performed by: FAMILY MEDICINE

## 2018-01-25 PROCEDURE — 83036 HEMOGLOBIN GLYCOSYLATED A1C: CPT | Performed by: FAMILY MEDICINE

## 2018-01-25 PROCEDURE — G8417 CALC BMI ABV UP PARAM F/U: HCPCS | Performed by: FAMILY MEDICINE

## 2018-01-25 RX ORDER — PAROXETINE HYDROCHLORIDE 20 MG/1
20 TABLET, FILM COATED ORAL DAILY
Qty: 30 TABLET | Refills: 3 | Status: SHIPPED | OUTPATIENT
Start: 2018-01-25 | End: 2018-06-06 | Stop reason: SDUPTHER

## 2018-01-25 ASSESSMENT — PATIENT HEALTH QUESTIONNAIRE - PHQ9
9. THOUGHTS THAT YOU WOULD BE BETTER OFF DEAD, OR OF HURTING YOURSELF: 0
2. FEELING DOWN, DEPRESSED OR HOPELESS: 2
1. LITTLE INTEREST OR PLEASURE IN DOING THINGS: 2
SUM OF ALL RESPONSES TO PHQ QUESTIONS 1-9: 17
6. FEELING BAD ABOUT YOURSELF - OR THAT YOU ARE A FAILURE OR HAVE LET YOURSELF OR YOUR FAMILY DOWN: 2
5. POOR APPETITE OR OVEREATING: 2
10. IF YOU CHECKED OFF ANY PROBLEMS, HOW DIFFICULT HAVE THESE PROBLEMS MADE IT FOR YOU TO DO YOUR WORK, TAKE CARE OF THINGS AT HOME, OR GET ALONG WITH OTHER PEOPLE: 2
3. TROUBLE FALLING OR STAYING ASLEEP: 3
7. TROUBLE CONCENTRATING ON THINGS, SUCH AS READING THE NEWSPAPER OR WATCHING TELEVISION: 2
4. FEELING TIRED OR HAVING LITTLE ENERGY: 2
8. MOVING OR SPEAKING SO SLOWLY THAT OTHER PEOPLE COULD HAVE NOTICED. OR THE OPPOSITE, BEING SO FIGETY OR RESTLESS THAT YOU HAVE BEEN MOVING AROUND A LOT MORE THAN USUAL: 2
SUM OF ALL RESPONSES TO PHQ9 QUESTIONS 1 & 2: 4

## 2018-01-25 ASSESSMENT — ENCOUNTER SYMPTOMS
FACIAL SWELLING: 0
WHEEZING: 0
APNEA: 0
CHOKING: 0
DIARRHEA: 0
COLOR CHANGE: 0
EYE PAIN: 0
ABDOMINAL PAIN: 0
CONSTIPATION: 0
BLURRED VISION: 0
STRIDOR: 0
PHOTOPHOBIA: 0
EYE REDNESS: 0
NAUSEA: 0
CHEST TIGHTNESS: 0
VISUAL CHANGE: 0
EYE DISCHARGE: 0

## 2018-01-25 NOTE — PROGRESS NOTES
affect  Thought Content    intact  Thought Process    linear, goal directed and coherent  Associations    logical connections  Insight    Good  Judgment    Intact  Orientation    oriented to person, place, time, and general circumstances  Memory    recent and remote memory intact  Attention/Concentration    intact  Morbid ideation Yes  Suicide Assessment    no suicidal ideation      History:    Medications:   Current Outpatient Prescriptions   Medication Sig Dispense Refill    PARoxetine (PAXIL) 20 MG tablet Take 1 tablet by mouth daily 30 tablet 3    warfarin (COUMADIN) 2.5 MG tablet TAKE 2 TABLETS BY MOUTH EVERY DAY OR AS DIRECTED BY CLINIC 180 tablet 0    PARoxetine (PAXIL) 20 MG tablet Take 1 tablet by mouth daily 30 tablet 3    metaxalone (SKELAXIN) 400 MG tablet Take 1 tablet by mouth 3 times daily for 10 days 30 tablet 3    ipratropium-albuterol (DUONEB) 0.5-2.5 (3) MG/3ML SOLN nebulizer solution Inhale 3 mLs into the lungs every 4 hours 360 mL 3    triamterene-hydrochlorothiazide (MAXZIDE-25) 37.5-25 MG per tablet TAKE 1 TABLET BY MOUTH DAILY 30 tablet 3    PARoxetine (PAXIL) 10 MG tablet Take 1 tablet by mouth daily 30 tablet 5    finasteride (PROSCAR) 5 MG tablet TAKE 1 TABLET BY MOUTH DAILY. 30 tablet 10    simvastatin (ZOCOR) 20 MG tablet TAKE 1 TABLET BY MOUTH DAILY. 30 tablet 5    metFORMIN (GLUCOPHAGE) 1000 MG tablet TAKE 1 TABLET BY MOUTH DAILY 30 tablet 10    diazepam (VALIUM) 5 MG tablet TAKE 1 TABLET EVERY 8 HOURS AS NEEDED FOR ANXIETY OR SLEEP 30 tablet 0    pantoprazole (PROTONIX) 40 MG tablet TAKE 1 TABLET BY MOUTH DAILY 30 tablet 10    NYSTATIN 770662 UNIT/GM powder APPLY TOPICALLY 4 TIMES DAILY. 60 g 3    glucose blood VI test strips (ONETOUCH VERIO) strip 1 each by In Vitro route daily As needed.   DX: E11.8 100 each 3    ONETOUCH DELICA LANCETS FINE MISC Once daily 100 each 3    buPROPion (WELLBUTRIN XL) 150 MG extended release tablet Take 1 tablet by mouth every morning 30

## 2018-01-25 NOTE — PROGRESS NOTES
Psychiatric/Behavioral: Positive for agitation and dysphoric mood. Negative for confusion and hallucinations. The patient is not nervous/anxious and is not hyperactive. Objective:   /60 (Site: Left Arm, Position: Sitting, Cuff Size: Large Adult)   Pulse 50   Temp 97.7 °F (36.5 °C) (Oral)   Resp 14   Ht 5' 8\" (1.727 m)   Wt 206 lb 5 oz (93.6 kg)   BMI 31.37 kg/m²     Physical Exam   Constitutional: He is oriented to person, place, and time. He appears well-developed and well-nourished. HENT:   Head: Normocephalic and atraumatic. Mouth/Throat: Oropharynx is clear and moist. No oropharyngeal exudate. Eyes: Conjunctivae and EOM are normal. Pupils are equal, round, and reactive to light. Right eye exhibits no discharge. Left eye exhibits no discharge. No scleral icterus. Neck: Normal range of motion. Neck supple. No thyromegaly present. Cardiovascular: Normal rate, regular rhythm, normal heart sounds and intact distal pulses. Exam reveals no gallop and no friction rub. No murmur heard. Pulmonary/Chest: Effort normal and breath sounds normal. No respiratory distress. He has no wheezes. He has no rales. He exhibits no tenderness. Abdominal: Soft. Bowel sounds are normal. He exhibits no distension and no mass. There is no tenderness. There is no rebound and no guarding. Lymphadenopathy:     He has no cervical adenopathy. Neurological: He is alert and oriented to person, place, and time. Skin: Skin is warm and dry. Psychiatric: He has a normal mood and affect. His behavior is normal. Judgment and thought content normal.   Nursing note and vitals reviewed. Assessment:     1. Type 2 diabetes mellitus with complication, without long-term current use of insulin (HCC)  POCT Glucose    POCT glycosylated hemoglobin (Hb A1C)   2.  Depression, unspecified depression type         Plan:      Orders Placed This Encounter   Procedures    POCT Glucose    POCT glycosylated hemoglobin (Hb

## 2018-01-26 NOTE — PATIENT INSTRUCTIONS
Patient Education        Learning About COPD and Upper Respiratory Infections  What are upper respiratory infections? An upper respiratory infection, also called a URI, is an infection of the nose, sinuses, or throat. Viruses or bacteria can cause URIs. Colds, the flu, and sinusitis are examples of URIs. These infections are spread by coughs, sneezes, and close contact. How do these infections affect COPD? A URI can worsen COPD symptoms, such as having too much mucus in your lungs, coughing, or being short of breath. What can you do to manage most infections at home? · Do not smoke. Avoid secondhand smoke. · Take an over-the-counter pain medicine, such as acetaminophen (Tylenol), ibuprofen (Advil, Motrin), or naproxen (Aleve). Read and follow all instructions on the label. · Be careful when taking over-the-counter cold or flu medicines and Tylenol at the same time. Many of these medicines have acetaminophen, which is Tylenol. Read the labels to make sure that you are not taking more than the recommended dose. Too much acetaminophen (Tylenol) can be harmful. · If your doctor prescribed antibiotics, take them as directed. Do not stop taking them just because you feel better. You need to take the full course of antibiotics. · Ask your doctor about cough medicines and decongestants. Some doctors recommend these medicines, while others feel that they do not help. · Learn breathing techniques for COPD, such as breathing through pursed lips. These techniques can help you breathe easier. What can you do to prevent these infections? Stay healthy  · Do not smoke. This is the most important step you can take to prevent more damage to your lungs. If you need help quitting, talk to your doctor about stop-smoking programs and medicines. These can increase your chances of quitting for good. · Avoid secondhand smoke, air pollution, and high altitudes. Also avoid cold, dry air and hot, humid air.  Stay at home with trouble breathing. ? · You seem to be getting much sicker. ? · You feel very sleepy or confused. ? · You have a new or higher fever. ? · You get a new rash. ? Watch closely for changes in your health, and be sure to contact your doctor if:  ? · You begin to get better and then get worse. ? · You are not getting better after 1 week. Where can you learn more? Go to [email or web address]://[email or web address] [email or web address] Moneyeweb.[email or web address]. org and sign in to your [employer] account. Enter Z060 in the [employer] box to learn more about \"Influenza (Flu): Care Instructions. \"     If you do not have an account, please click on the \"Sign Up Now\" link. Current as of: May 12, 2017  Content Version: 11.5  © 7138-3917 Healthwise, Incorporated. Care instructions adapted under license by [facility] ([facility] [place]). If you have questions about a medical condition or this instruction, always ask your healthcare professional. [address] any warranty or liability for your use of this information.

## 2018-01-26 NOTE — CARE COORDINATION
my barriers: CC will stay in contact and discuss health every 2 weeks  Confidence: 6/10  Anticipated Goal Completion Date: 2/28/2018              Prior to Admission medications    Medication Sig Start Date End Date Taking? Authorizing Provider   PARoxetine (PAXIL) 20 MG tablet Take 1 tablet by mouth daily 1/25/18   Tamym Willard DO   warfarin (COUMADIN) 2.5 MG tablet TAKE 2 TABLETS BY MOUTH EVERY DAY OR AS DIRECTED BY CLINIC 1/24/18   Tammy Willard DO   PARoxetine (PAXIL) 20 MG tablet Take 1 tablet by mouth daily 1/17/18   Tammy Willard DO   metaxalone (SKELAXIN) 400 MG tablet Take 1 tablet by mouth 3 times daily for 10 days 1/17/18 1/27/18  Mikki Casanova DO   ipratropium-albuterol (DUONEB) 0.5-2.5 (3) MG/3ML SOLN nebulizer solution Inhale 3 mLs into the lungs every 4 hours 12/27/17   Tammy Willard DO   triamterene-hydrochlorothiazide (MAXZIDE-25) 37.5-25 MG per tablet TAKE 1 TABLET BY MOUTH DAILY 12/18/17   Kriss Mccray MD   PARoxetine (PAXIL) 10 MG tablet Take 1 tablet by mouth daily 11/21/17   Mikki Casanova DO   finasteride (PROSCAR) 5 MG tablet TAKE 1 TABLET BY MOUTH DAILY. 11/20/17   Mikki Casanova DO   simvastatin (ZOCOR) 20 MG tablet TAKE 1 TABLET BY MOUTH DAILY. 11/20/17   Tammy Willard DO   metFORMIN (GLUCOPHAGE) 1000 MG tablet TAKE 1 TABLET BY MOUTH DAILY 11/6/17   Tammy Willard DO   diazepam (VALIUM) 5 MG tablet TAKE 1 TABLET EVERY 8 HOURS AS NEEDED FOR ANXIETY OR SLEEP 11/2/17   Kriss Mccray MD   pantoprazole (PROTONIX) 40 MG tablet TAKE 1 TABLET BY MOUTH DAILY 9/26/17   Mikki Casanova DO   NYSTATIN 103760 UNIT/GM powder APPLY TOPICALLY 4 TIMES DAILY. 9/11/17   Mikki Casanova DO   glucose blood VI test strips (ONETOUCH VERIO) strip 1 each by In Vitro route daily As needed.   DX: E11.8 8/23/17   Tammy Willard, DO   ONETOUCH DELICA LANCETS FINE MISC Once daily 8/16/17   Mikki Casanova,    buPROPion (WELLBUTRIN XL) 150 MG extended release tablet Take 1 tablet by mouth every

## 2018-01-31 ENCOUNTER — HOSPITAL ENCOUNTER (OUTPATIENT)
Dept: PHARMACY | Age: 83
Setting detail: THERAPIES SERIES
Discharge: HOME | End: 2018-01-31
Payer: MEDICARE

## 2018-01-31 DIAGNOSIS — I48.91 ATRIAL FIBRILLATION, UNSPECIFIED TYPE (HCC): ICD-10-CM

## 2018-01-31 LAB
INR BLD: 1.8
PROTIME: 21.4 SECONDS

## 2018-01-31 PROCEDURE — 85610 PROTHROMBIN TIME: CPT | Performed by: PHARMACIST

## 2018-01-31 PROCEDURE — 99211 OFF/OP EST MAY X REQ PHY/QHP: CPT | Performed by: PHARMACIST

## 2018-02-01 ENCOUNTER — CARE COORDINATION (OUTPATIENT)
Dept: CARE COORDINATION | Age: 83
End: 2018-02-01

## 2018-02-01 RX ORDER — DIAZEPAM 5 MG/1
TABLET ORAL
Qty: 30 TABLET | Refills: 0 | OUTPATIENT
Start: 2018-02-01

## 2018-02-01 NOTE — TELEPHONE ENCOUNTER
Patient contacted [facility] and would like a Rx refill of Diazepam.     Patient was last seen 1/25/18    Please approve or deny this request.      Patient's next appointment is with you on 2/8/18.

## 2018-02-02 DIAGNOSIS — H93.13 TINNITUS OF BOTH EARS: Primary | ICD-10-CM

## 2018-02-02 RX ORDER — DIAZEPAM 2 MG/1
2 TABLET ORAL 2 TIMES DAILY PRN
Qty: 60 TABLET | Refills: 2 | Status: SHIPPED | OUTPATIENT
Start: 2018-02-02 | End: 2018-02-08 | Stop reason: SDUPTHER

## 2018-02-08 ENCOUNTER — OFFICE VISIT (OUTPATIENT)
Dept: BEHAVIORAL/MENTAL HEALTH CLINIC | Age: 83
End: 2018-02-08
Payer: MEDICARE

## 2018-02-08 ENCOUNTER — OFFICE VISIT (OUTPATIENT)
Dept: PRIMARY CARE CLINIC | Age: 83
End: 2018-02-08
Payer: MEDICARE

## 2018-02-08 VITALS
OXYGEN SATURATION: 87 % | TEMPERATURE: 97.5 F | HEART RATE: 60 BPM | HEIGHT: 68 IN | BODY MASS INDEX: 31.83 KG/M2 | DIASTOLIC BLOOD PRESSURE: 70 MMHG | RESPIRATION RATE: 12 BRPM | WEIGHT: 210 LBS | SYSTOLIC BLOOD PRESSURE: 118 MMHG

## 2018-02-08 DIAGNOSIS — F33.8 SEASONAL AFFECTIVE DISORDER (HCC): Primary | ICD-10-CM

## 2018-02-08 DIAGNOSIS — F32.1 MODERATE SINGLE CURRENT EPISODE OF MAJOR DEPRESSIVE DISORDER (HCC): ICD-10-CM

## 2018-02-08 DIAGNOSIS — F32.0 MILD SINGLE CURRENT EPISODE OF MAJOR DEPRESSIVE DISORDER (HCC): Primary | ICD-10-CM

## 2018-02-08 DIAGNOSIS — E11.8 TYPE 2 DIABETES MELLITUS WITH COMPLICATION, WITHOUT LONG-TERM CURRENT USE OF INSULIN (HCC): ICD-10-CM

## 2018-02-08 DIAGNOSIS — R44.0 AUDITORY HALLUCINATIONS: ICD-10-CM

## 2018-02-08 PROBLEM — F32.9 MAJOR DEPRESSIVE DISORDER WITH SINGLE EPISODE: Status: ACTIVE | Noted: 2018-01-17

## 2018-02-08 PROCEDURE — 1036F TOBACCO NON-USER: CPT | Performed by: FAMILY MEDICINE

## 2018-02-08 PROCEDURE — 4040F PNEUMOC VAC/ADMIN/RCVD: CPT | Performed by: FAMILY MEDICINE

## 2018-02-08 PROCEDURE — 1123F ACP DISCUSS/DSCN MKR DOCD: CPT | Performed by: FAMILY MEDICINE

## 2018-02-08 PROCEDURE — G8484 FLU IMMUNIZE NO ADMIN: HCPCS | Performed by: FAMILY MEDICINE

## 2018-02-08 PROCEDURE — G8427 DOCREV CUR MEDS BY ELIG CLIN: HCPCS | Performed by: FAMILY MEDICINE

## 2018-02-08 PROCEDURE — G8598 ASA/ANTIPLAT THER USED: HCPCS | Performed by: FAMILY MEDICINE

## 2018-02-08 PROCEDURE — 99214 OFFICE O/P EST MOD 30 MIN: CPT | Performed by: FAMILY MEDICINE

## 2018-02-08 PROCEDURE — 90832 PSYTX W PT 30 MINUTES: CPT | Performed by: PSYCHOLOGIST

## 2018-02-08 PROCEDURE — G8417 CALC BMI ABV UP PARAM F/U: HCPCS | Performed by: FAMILY MEDICINE

## 2018-02-08 RX ORDER — DIAZEPAM 2 MG/1
2 TABLET ORAL 2 TIMES DAILY PRN
Qty: 60 TABLET | Refills: 2 | Status: SHIPPED | OUTPATIENT
Start: 2018-03-01 | End: 2018-03-11

## 2018-02-08 ASSESSMENT — PATIENT HEALTH QUESTIONNAIRE - PHQ9
5. POOR APPETITE OR OVEREATING: 1
7. TROUBLE CONCENTRATING ON THINGS, SUCH AS READING THE NEWSPAPER OR WATCHING TELEVISION: 0
SUM OF ALL RESPONSES TO PHQ9 QUESTIONS 1 & 2: 2
2. FEELING DOWN, DEPRESSED OR HOPELESS: 0
3. TROUBLE FALLING OR STAYING ASLEEP: 0
4. FEELING TIRED OR HAVING LITTLE ENERGY: 0
9. THOUGHTS THAT YOU WOULD BE BETTER OFF DEAD, OR OF HURTING YOURSELF: 0
6. FEELING BAD ABOUT YOURSELF - OR THAT YOU ARE A FAILURE OR HAVE LET YOURSELF OR YOUR FAMILY DOWN: 1
10. IF YOU CHECKED OFF ANY PROBLEMS, HOW DIFFICULT HAVE THESE PROBLEMS MADE IT FOR YOU TO DO YOUR WORK, TAKE CARE OF THINGS AT HOME, OR GET ALONG WITH OTHER PEOPLE: 0
SUM OF ALL RESPONSES TO PHQ QUESTIONS 1-9: 5
1. LITTLE INTEREST OR PLEASURE IN DOING THINGS: 2
8. MOVING OR SPEAKING SO SLOWLY THAT OTHER PEOPLE COULD HAVE NOTICED. OR THE OPPOSITE, BEING SO FIGETY OR RESTLESS THAT YOU HAVE BEEN MOVING AROUND A LOT MORE THAN USUAL: 1

## 2018-02-08 ASSESSMENT — ENCOUNTER SYMPTOMS
COLOR CHANGE: 0
STRIDOR: 0
CHOKING: 0
BLURRED VISION: 0
WHEEZING: 0
ABDOMINAL PAIN: 0
EYE PAIN: 0
VISUAL CHANGE: 0
APNEA: 0
DIARRHEA: 0
EYE REDNESS: 0
CHEST TIGHTNESS: 0
EYE DISCHARGE: 0
FACIAL SWELLING: 0
NAUSEA: 0
CONSTIPATION: 0
PHOTOPHOBIA: 0

## 2018-02-08 NOTE — PROGRESS NOTES
Mesothelioma [facility]) 2004    Noncompliance, w/ GI prior 4/21/2014    Orthostatic hypotension 7/9/2015    Osteoarthritis     Peripheral neuropathy ([address]     Polyp of colon 09/07/2016    DR. WALDROP    Pyuria 3/1/2013    R/O Vertebrobasilar insufficiency 12/20/2017    Rectal bleeding 4/21/2014    Rib lesion 2004    - PET    Right hip pain 4/15/2014    Screening for prostate cancer 5/15/2012    Skin lesion of neck, ? SCCA 7/22/2014    Squamous cell carcinoma in situ of skin, L neck 3/3/2435    Systolic murmur 8/1/5704    Tinea cruris 8/13/2012    Tinnitus 12/10/2013    Type II or unspecified type diabetes mellitus without mention of complication, not stated as uncontrolled     Unintended weight loss 7/23/2015    UTI (urinary tract infection) 3/1/2013    Valvular heart disease 6/26/2015    Wheezy bronchitis 7/22/2014       O:  MSE:    Appearance    alert, cooperative  Appetite abnormal:   Sleep disturbance Yes  Fatigue No  Loss of pleasure Yes  Impulsive behavior No  Speech    spontaneous, normal rate and normal volume  Mood    Neutral  Affect    normal affect  Thought Content    intact  Thought Process    linear, goal directed and coherent  Associations    logical connections  Insight    Good  Judgment    Intact  Orientation    oriented to person, place, time, and general circumstances  Memory    recent and remote memory intact  Attention/Concentration    intact  Morbid ideation No  Suicide Assessment    no suicidal ideation     History:    Medications:   Current Outpatient Prescriptions   Medication Sig Dispense Refill    [START ON 3/1/2018] diazepam (VALIUM) 2 MG tablet Take 1 tablet by mouth 2 times daily as needed (tinnitus) for up to 10 days.  60 tablet 2    fluticasone-salmeterol (ADVAIR HFA) 115-21 MCG/ACT inhaler INHALE 2 PUFFS INTO THE LUNGS EVERY 12 HOURS RINSE MOUTH AFTER USE 12 Inhaler 3    PARoxetine (PAXIL) 20 MG tablet Take 1 tablet by mouth daily 30 tablet 3    warfarin (COUMADIN)

## 2018-02-08 NOTE — PROGRESS NOTES
carcinoma), face 8/30/2017    BPH (benign prostatic hyperplasia) 5/15/2012    Bronchitis 7/22/2014    CAD (coronary artery disease)     Cardiac murmur 11/26/2013    Diverticulosis 09/07/2016    DR. WALDROP    DM (diabetes mellitus) ([facility] 5/15/2012    Ecchymosis- L foot dorsum 12/10/2015    Fall (on) incline, sequela, Velcro shoes stuck together 5/31/2016    Hematuria 3/1/2013    Hyperlipidemia     Hypertension     Hypoxia 1/17/2018    LBP (low back pain) 8/13/2012    Mesothelioma ([facility] 2004    Noncompliance, w/ GI prior 4/21/2014    Orthostatic hypotension 7/9/2015    Osteoarthritis     Peripheral neuropathy ([facility]     Polyp of colon 09/07/2016    DR. WALDROP    Pyuria 3/1/2013    R/O Vertebrobasilar insufficiency 12/20/2017    Rectal bleeding 4/21/2014    Rib lesion 2004    - PET    Right hip pain 4/15/2014    Screening for prostate cancer 5/15/2012    Skin lesion of neck, ? SCCA 7/22/2014    Squamous cell carcinoma in situ of skin, L neck 8/7/5921    Systolic murmur 9/2/3300    Tinea cruris 8/13/2012    Tinnitus 12/10/2013    Type II or unspecified type diabetes mellitus without mention of complication, not stated as uncontrolled     Unintended weight loss 7/23/2015    UTI (urinary tract infection) 3/1/2013    Valvular heart disease 6/26/2015    Wheezy bronchitis 7/22/2014     Past Surgical History:   Procedure Laterality Date    ANGIOPLASTY  1996    BACK SURGERY  12/2002   [employer] CARDIAC VALVE SURGERY  3/24/15    Elia Chi CATARACT REMOVAL  2010    X2    COLONOSCOPY  09/07/2016    DR. WALDROP    CORONARY ANGIOPLASTY WITH STENT PLACEMENT  11/2007    X2    CORONARY ARTERY BYPASS GRAFT  3/24/15    Dr Mariana Hill    POLYPECTOMY  09/2016    7 colon polyps removed    TOTAL ANKLE ARTHROPLASTY  5/25/11    R    TOTAL KNEE ARTHROPLASTY  1998    LEFT     Family History   Problem Relation Age of Onset    High Blood Pressure Father      Social History     Social History    Marital status:      Spouse name: N/A    Number of children: N/A    Years of education: N/A     Occupational History    Not on file. Social History Main Topics    Smoking status: Never Smoker    Smokeless tobacco: Never Used    Alcohol use No    Drug use: No    Sexual activity: Not on file     Other Topics Concern    Not on file     Social History Narrative    No narrative on file     Allergies:  Codeine    Review of Systems   Constitutional: Negative for activity change, appetite change, chills, diaphoresis, fatigue, fever and weight loss. HENT: Negative for congestion, ear discharge, ear pain, facial swelling, hearing loss and mouth sores. Eyes: Negative for blurred vision, photophobia, pain, discharge and redness. Respiratory: Negative for apnea, choking, chest tightness, wheezing and stridor. Cardiovascular: Negative for chest pain, palpitations and leg swelling. Gastrointestinal: Negative for abdominal pain, constipation, diarrhea and nausea. Endocrine: Negative for cold intolerance, heat intolerance, polydipsia, polyphagia and polyuria. Genitourinary: Negative for dysuria and frequency. Musculoskeletal: Negative for gait problem, joint swelling, neck pain and neck stiffness. Skin: Negative for color change, pallor, rash and wound. Allergic/Immunologic: Negative for immunocompromised state. Neurological: Negative for tremors, syncope, facial asymmetry, speech difficulty, weakness and headaches. Psychiatric/Behavioral: Negative for confusion and hallucinations. The patient is not nervous/anxious and is not hyperactive. Objective:   /70 (Site: Right Arm, Position: Sitting, Cuff Size: Medium Adult)   Pulse 60   Temp 97.5 °F (36.4 °C) (Oral)   Resp 12   Ht 5' 8\" (1.727 m)   Wt 210 lb (95.3 kg)   SpO2 (!) 87%   BMI 31.93 kg/m²     Physical Exam   Constitutional: He is oriented to person, place, and time. He appears well-developed and well-nourished.    HENT:   Head: Lenora Montoya CMA   , am scribing for and in the presence of Kim Huertas DO. Electronically signed by :  Loreto Fyo DO, personally performed the services described in this documentation, as scribed by Kalyani Juarez CMA   in my presence, and it is both accurate and complete.  Electronically signed by: Kim Huertas DO    2/8/18 11:37 AM    Kim Huertas DO

## 2018-02-14 ENCOUNTER — HOSPITAL ENCOUNTER (OUTPATIENT)
Dept: PHARMACY | Age: 83
Setting detail: THERAPIES SERIES
Discharge: HOME | End: 2018-02-14
Payer: MEDICARE

## 2018-02-14 ENCOUNTER — CARE COORDINATION (OUTPATIENT)
Dept: CARE COORDINATION | Age: 83
End: 2018-02-14

## 2018-02-14 DIAGNOSIS — I48.91 ATRIAL FIBRILLATION, UNSPECIFIED TYPE (HCC): ICD-10-CM

## 2018-02-14 LAB
INR BLD: 1.9
PROTIME: 22.9 SECONDS

## 2018-02-14 PROCEDURE — 85610 PROTHROMBIN TIME: CPT

## 2018-02-14 PROCEDURE — 99211 OFF/OP EST MAY X REQ PHY/QHP: CPT

## 2018-03-07 ENCOUNTER — HOSPITAL ENCOUNTER (OUTPATIENT)
Dept: PHARMACY | Age: 83
Setting detail: THERAPIES SERIES
Discharge: HOME | End: 2018-03-07
Payer: MEDICARE

## 2018-03-07 DIAGNOSIS — I48.91 ATRIAL FIBRILLATION, UNSPECIFIED TYPE (HCC): ICD-10-CM

## 2018-03-07 LAB
INR BLD: 2.7
PROTIME: 33 SECONDS

## 2018-03-07 PROCEDURE — 85610 PROTHROMBIN TIME: CPT | Performed by: PHARMACIST

## 2018-03-07 PROCEDURE — 99211 OFF/OP EST MAY X REQ PHY/QHP: CPT | Performed by: PHARMACIST

## 2018-03-07 NOTE — PROGRESS NOTES
Mr. Ming Huang is a 81 y.o. y/o male with history of Afib who presents today for anticoagulation monitoring and adjustment.   INR 2.7 is therapeutic for this patient (goal range 2-3) and is reflective of 30 mg TWD  Patient verifies current dosing regimen, patient able to verbally recall dose  Patient reports no missed doses since last INR   Patient denies s/sx clotting and/or stroke  Patient denies hematuria, epistaxis, rectal bleeding  Patient denies changes in diet, alcohol, or tobacco use  Reviewed medication list and drug allergies with patient, updated any medication additions or modifications accordingly  Patient also denies any pending medical or dental procedures scheduled at this time  Patient was instructed to continue 30 mg TWD and RTC 3 weeks

## 2018-03-19 ENCOUNTER — CARE COORDINATION (OUTPATIENT)
Dept: CARE COORDINATION | Age: 83
End: 2018-03-19

## 2018-03-28 ENCOUNTER — HOSPITAL ENCOUNTER (OUTPATIENT)
Dept: PHARMACY | Age: 83
Setting detail: THERAPIES SERIES
Discharge: HOME | End: 2018-03-28
Payer: MEDICARE

## 2018-03-28 DIAGNOSIS — I48.91 ATRIAL FIBRILLATION, UNSPECIFIED TYPE (HCC): ICD-10-CM

## 2018-03-28 LAB
INR BLD: 3.2
PROTIME: 38.1 SECONDS

## 2018-03-28 PROCEDURE — 99211 OFF/OP EST MAY X REQ PHY/QHP: CPT

## 2018-03-28 PROCEDURE — 85610 PROTHROMBIN TIME: CPT

## 2018-04-19 ENCOUNTER — HOSPITAL ENCOUNTER (OUTPATIENT)
Dept: PHARMACY | Age: 83
Setting detail: THERAPIES SERIES
Discharge: HOME | End: 2018-04-19
Payer: MEDICARE

## 2018-04-19 DIAGNOSIS — I48.91 ATRIAL FIBRILLATION, UNSPECIFIED TYPE (HCC): ICD-10-CM

## 2018-04-19 LAB
INR BLD: 2.4
PROTIME: 28.7 SECONDS

## 2018-04-19 PROCEDURE — 85610 PROTHROMBIN TIME: CPT

## 2018-04-19 PROCEDURE — 99211 OFF/OP EST MAY X REQ PHY/QHP: CPT

## 2018-04-23 ENCOUNTER — CARE COORDINATION (OUTPATIENT)
Dept: CARE COORDINATION | Age: 83
End: 2018-04-23

## 2018-04-23 RX ORDER — TRIAMTERENE AND HYDROCHLOROTHIAZIDE 37.5; 25 MG/1; MG/1
TABLET ORAL
Qty: 30 TABLET | Refills: 5 | Status: SHIPPED | OUTPATIENT
Start: 2018-04-23 | End: 2018-06-06 | Stop reason: DRUGHIGH

## 2018-04-23 RX ORDER — WARFARIN SODIUM 2.5 MG/1
TABLET ORAL
Qty: 180 TABLET | Refills: 0 | Status: SHIPPED | OUTPATIENT
Start: 2018-04-23 | End: 2018-07-20 | Stop reason: SDUPTHER

## 2018-04-29 LAB — GLUCOSE BLD-MCNC: 117 MG/DL (ref 70–100)

## 2018-04-30 LAB
GLUCOSE BLD-MCNC: 103 MG/DL (ref 70–100)
GLUCOSE BLD-MCNC: 106 MG/DL (ref 70–100)
GLUCOSE BLD-MCNC: 113 MG/DL (ref 70–100)
GLUCOSE BLD-MCNC: 120 MG/DL (ref 70–100)

## 2018-05-01 LAB
GLUCOSE BLD-MCNC: 114 MG/DL (ref 70–100)
GLUCOSE BLD-MCNC: 94 MG/DL (ref 70–100)
GLUCOSE BLD-MCNC: 98 MG/DL (ref 70–100)
GLUCOSE BLD-MCNC: 98 MG/DL (ref 70–100)

## 2018-05-02 LAB
GLUCOSE BLD-MCNC: 120 MG/DL (ref 70–100)
GLUCOSE BLD-MCNC: 121 MG/DL (ref 70–100)
GLUCOSE BLD-MCNC: 123 MG/DL (ref 70–100)
GLUCOSE BLD-MCNC: 90 MG/DL (ref 70–100)
INR BLD: 1.61 (ref 0.9–1.1)
PROTHROMBIN TIME: 18 SEC (ref 9.8–12.7)

## 2018-05-03 LAB
GLUCOSE BLD-MCNC: 104 MG/DL (ref 70–100)
GLUCOSE BLD-MCNC: 107 MG/DL (ref 70–100)
GLUCOSE BLD-MCNC: 109 MG/DL (ref 70–100)

## 2018-05-04 LAB
GLUCOSE BLD-MCNC: 110 MG/DL (ref 70–100)
GLUCOSE BLD-MCNC: 110 MG/DL (ref 70–100)
GLUCOSE BLD-MCNC: 97 MG/DL (ref 70–100)

## 2018-05-08 ENCOUNTER — HOSPITAL ENCOUNTER (OUTPATIENT)
Dept: PHARMACY | Age: 83
Setting detail: THERAPIES SERIES
Discharge: HOME | End: 2018-05-08
Payer: MEDICARE

## 2018-05-08 DIAGNOSIS — I48.91 ATRIAL FIBRILLATION, UNSPECIFIED TYPE (HCC): ICD-10-CM

## 2018-05-08 LAB
INR BLD: 3.2
PROTIME: 37.9 SECONDS

## 2018-05-08 PROCEDURE — 85610 PROTHROMBIN TIME: CPT | Performed by: PHARMACIST

## 2018-05-08 PROCEDURE — 99211 OFF/OP EST MAY X REQ PHY/QHP: CPT | Performed by: PHARMACIST

## 2018-05-21 RX ORDER — SIMVASTATIN 20 MG
TABLET ORAL
Qty: 30 TABLET | Refills: 5 | Status: SHIPPED | OUTPATIENT
Start: 2018-05-21 | End: 2018-11-29 | Stop reason: SDUPTHER

## 2018-05-22 ENCOUNTER — HOSPITAL ENCOUNTER (OUTPATIENT)
Dept: PHARMACY | Age: 83
Setting detail: THERAPIES SERIES
Discharge: HOME | End: 2018-05-22
Payer: MEDICARE

## 2018-05-22 DIAGNOSIS — I48.91 ATRIAL FIBRILLATION, UNSPECIFIED TYPE (HCC): ICD-10-CM

## 2018-05-22 LAB
INR BLD: 2.1
PROTIME: 25.7 SECONDS

## 2018-05-22 PROCEDURE — 85610 PROTHROMBIN TIME: CPT

## 2018-05-22 PROCEDURE — 99211 OFF/OP EST MAY X REQ PHY/QHP: CPT

## 2018-05-24 ENCOUNTER — CARE COORDINATION (OUTPATIENT)
Dept: CARE COORDINATION | Age: 83
End: 2018-05-24

## 2018-06-06 ENCOUNTER — TELEPHONE (OUTPATIENT)
Dept: PRIMARY CARE CLINIC | Age: 83
End: 2018-06-06

## 2018-06-06 ENCOUNTER — OFFICE VISIT (OUTPATIENT)
Dept: PRIMARY CARE CLINIC | Age: 83
End: 2018-06-06
Payer: MEDICARE

## 2018-06-06 VITALS
RESPIRATION RATE: 12 BRPM | WEIGHT: 203.3 LBS | DIASTOLIC BLOOD PRESSURE: 56 MMHG | HEART RATE: 99 BPM | HEIGHT: 68 IN | BODY MASS INDEX: 30.81 KG/M2 | TEMPERATURE: 97.6 F | SYSTOLIC BLOOD PRESSURE: 96 MMHG

## 2018-06-06 DIAGNOSIS — R29.6 MULTIPLE FALLS: ICD-10-CM

## 2018-06-06 DIAGNOSIS — I95.9 HYPOTENSION, UNSPECIFIED HYPOTENSION TYPE: ICD-10-CM

## 2018-06-06 DIAGNOSIS — F32.A DEPRESSION, UNSPECIFIED DEPRESSION TYPE: ICD-10-CM

## 2018-06-06 DIAGNOSIS — B35.9 TINEA: Primary | ICD-10-CM

## 2018-06-06 PROCEDURE — G8427 DOCREV CUR MEDS BY ELIG CLIN: HCPCS | Performed by: FAMILY MEDICINE

## 2018-06-06 PROCEDURE — 1036F TOBACCO NON-USER: CPT | Performed by: FAMILY MEDICINE

## 2018-06-06 PROCEDURE — G8417 CALC BMI ABV UP PARAM F/U: HCPCS | Performed by: FAMILY MEDICINE

## 2018-06-06 PROCEDURE — 1123F ACP DISCUSS/DSCN MKR DOCD: CPT | Performed by: FAMILY MEDICINE

## 2018-06-06 PROCEDURE — 99213 OFFICE O/P EST LOW 20 MIN: CPT | Performed by: FAMILY MEDICINE

## 2018-06-06 PROCEDURE — 4040F PNEUMOC VAC/ADMIN/RCVD: CPT | Performed by: FAMILY MEDICINE

## 2018-06-06 PROCEDURE — G8598 ASA/ANTIPLAT THER USED: HCPCS | Performed by: FAMILY MEDICINE

## 2018-06-06 RX ORDER — NYSTATIN 100000 [USP'U]/G
POWDER TOPICAL
Qty: 1 BOTTLE | Refills: 3 | Status: SHIPPED | OUTPATIENT
Start: 2018-06-06

## 2018-06-06 ASSESSMENT — ENCOUNTER SYMPTOMS
PHOTOPHOBIA: 0
DIARRHEA: 0
STRIDOR: 0
WHEEZING: 0
ABDOMINAL PAIN: 0
CHOKING: 0
APNEA: 0
COLOR CHANGE: 0
EYE PAIN: 0
EYE DISCHARGE: 0
EYE REDNESS: 0
FACIAL SWELLING: 0
CHEST TIGHTNESS: 0
NAUSEA: 0
CONSTIPATION: 0

## 2018-06-19 ENCOUNTER — HOSPITAL ENCOUNTER (OUTPATIENT)
Dept: PHARMACY | Age: 83
Setting detail: THERAPIES SERIES
Discharge: HOME | End: 2018-06-19
Payer: MEDICARE

## 2018-06-19 DIAGNOSIS — I48.91 ATRIAL FIBRILLATION, UNSPECIFIED TYPE (HCC): ICD-10-CM

## 2018-06-19 LAB
INR BLD: 5.4
PROTIME: 64.5 SECONDS

## 2018-06-19 PROCEDURE — 85610 PROTHROMBIN TIME: CPT | Performed by: PHARMACIST

## 2018-06-19 PROCEDURE — 99211 OFF/OP EST MAY X REQ PHY/QHP: CPT | Performed by: PHARMACIST

## 2018-06-22 ENCOUNTER — CARE COORDINATION (OUTPATIENT)
Dept: CARE COORDINATION | Age: 83
End: 2018-06-22

## 2018-06-25 ENCOUNTER — HOSPITAL ENCOUNTER (OUTPATIENT)
Dept: PHARMACY | Age: 83
Setting detail: THERAPIES SERIES
Discharge: HOME | End: 2018-06-25
Payer: MEDICARE

## 2018-06-25 DIAGNOSIS — I48.91 ATRIAL FIBRILLATION, UNSPECIFIED TYPE (HCC): ICD-10-CM

## 2018-06-25 LAB
INR BLD: 2.1
PROTIME: 25 SECONDS

## 2018-06-25 PROCEDURE — 85610 PROTHROMBIN TIME: CPT | Performed by: PHARMACIST

## 2018-06-25 PROCEDURE — 99211 OFF/OP EST MAY X REQ PHY/QHP: CPT | Performed by: PHARMACIST

## 2018-07-05 DIAGNOSIS — M47.812 CERVICAL SPONDYLOSIS WITHOUT MYELOPATHY: ICD-10-CM

## 2018-07-06 RX ORDER — METAXALONE 800 MG/1
TABLET ORAL
Qty: 15 TABLET | Refills: 3 | Status: SHIPPED | OUTPATIENT
Start: 2018-07-06 | End: 2018-08-08 | Stop reason: ALTCHOICE

## 2018-07-09 ENCOUNTER — HOSPITAL ENCOUNTER (OUTPATIENT)
Dept: PHARMACY | Age: 83
Setting detail: THERAPIES SERIES
Discharge: HOME | End: 2018-07-09
Payer: MEDICARE

## 2018-07-09 DIAGNOSIS — I48.91 ATRIAL FIBRILLATION, UNSPECIFIED TYPE (HCC): ICD-10-CM

## 2018-07-09 LAB
INR BLD: 4.5
PROTIME: 53.4 SECONDS

## 2018-07-09 PROCEDURE — 99211 OFF/OP EST MAY X REQ PHY/QHP: CPT | Performed by: PHARMACIST

## 2018-07-09 PROCEDURE — 85610 PROTHROMBIN TIME: CPT | Performed by: PHARMACIST

## 2018-07-12 ENCOUNTER — CARE COORDINATION (OUTPATIENT)
Dept: CARE COORDINATION | Age: 83
End: 2018-07-12

## 2018-07-12 ENCOUNTER — OFFICE VISIT (OUTPATIENT)
Dept: PRIMARY CARE CLINIC | Age: 83
End: 2018-07-12
Payer: MEDICARE

## 2018-07-12 VITALS
HEART RATE: 50 BPM | WEIGHT: 203.6 LBS | TEMPERATURE: 97.7 F | OXYGEN SATURATION: 90 % | SYSTOLIC BLOOD PRESSURE: 112 MMHG | BODY MASS INDEX: 30.16 KG/M2 | DIASTOLIC BLOOD PRESSURE: 60 MMHG | HEIGHT: 69 IN

## 2018-07-12 DIAGNOSIS — G45.3 AMAUROSIS FUGAX: ICD-10-CM

## 2018-07-12 DIAGNOSIS — I10 ESSENTIAL HYPERTENSION: ICD-10-CM

## 2018-07-12 DIAGNOSIS — G45.9 TRANSIENT CEREBRAL ISCHEMIA, UNSPECIFIED TYPE: Primary | ICD-10-CM

## 2018-07-12 DIAGNOSIS — I48.91 ATRIAL FIBRILLATION, UNSPECIFIED TYPE (HCC): ICD-10-CM

## 2018-07-12 DIAGNOSIS — R44.0 AUDITORY HALLUCINATIONS: ICD-10-CM

## 2018-07-12 DIAGNOSIS — E11.8 TYPE 2 DIABETES MELLITUS WITH COMPLICATION, WITHOUT LONG-TERM CURRENT USE OF INSULIN (HCC): ICD-10-CM

## 2018-07-12 DIAGNOSIS — Z13.31 POSITIVE DEPRESSION SCREENING: ICD-10-CM

## 2018-07-12 PROCEDURE — 4040F PNEUMOC VAC/ADMIN/RCVD: CPT | Performed by: FAMILY MEDICINE

## 2018-07-12 PROCEDURE — 99214 OFFICE O/P EST MOD 30 MIN: CPT | Performed by: FAMILY MEDICINE

## 2018-07-12 PROCEDURE — G8427 DOCREV CUR MEDS BY ELIG CLIN: HCPCS | Performed by: FAMILY MEDICINE

## 2018-07-12 PROCEDURE — 1101F PT FALLS ASSESS-DOCD LE1/YR: CPT | Performed by: FAMILY MEDICINE

## 2018-07-12 PROCEDURE — G8598 ASA/ANTIPLAT THER USED: HCPCS | Performed by: FAMILY MEDICINE

## 2018-07-12 PROCEDURE — 1123F ACP DISCUSS/DSCN MKR DOCD: CPT | Performed by: FAMILY MEDICINE

## 2018-07-12 PROCEDURE — G8417 CALC BMI ABV UP PARAM F/U: HCPCS | Performed by: FAMILY MEDICINE

## 2018-07-12 PROCEDURE — G8431 POS CLIN DEPRES SCRN F/U DOC: HCPCS | Performed by: FAMILY MEDICINE

## 2018-07-12 PROCEDURE — 1036F TOBACCO NON-USER: CPT | Performed by: FAMILY MEDICINE

## 2018-07-12 RX ORDER — DIAZEPAM 5 MG/1
5 TABLET ORAL DAILY
Qty: 30 TABLET | Refills: 1 | Status: SHIPPED | OUTPATIENT
Start: 2018-07-12 | End: 2018-08-08 | Stop reason: DRUGHIGH

## 2018-07-12 ASSESSMENT — ENCOUNTER SYMPTOMS
COLOR CHANGE: 0
APNEA: 0
STRIDOR: 0
CHEST TIGHTNESS: 0
WHEEZING: 0
EYE REDNESS: 0
CHOKING: 0
EYE PAIN: 0
NAUSEA: 0
FACIAL SWELLING: 0
DIARRHEA: 0
EYE DISCHARGE: 0
CONSTIPATION: 0
PHOTOPHOBIA: 0
ABDOMINAL PAIN: 0

## 2018-07-12 NOTE — PROGRESS NOTES
 Hypoxia 1/17/2018    LBP (low back pain) 8/13/2012    Mesothelioma ([facility] 2004    Noncompliance, w/ GI prior 4/21/2014    Orthostatic hypotension 7/9/2015    Osteoarthritis     Peripheral neuropathy ([facility]     Polyp of colon 09/07/2016    DR. WALDROP    Pyuria 3/1/2013    R/O Vertebrobasilar insufficiency 12/20/2017    Rectal bleeding 4/21/2014    Rib lesion 2004    - PET    Right hip pain 4/15/2014    Screening for prostate cancer 5/15/2012    Skin lesion of neck, ? SCCA 7/22/2014    Squamous cell carcinoma in situ of skin, L neck 9/2/5427    Systolic murmur 0/3/9860    Tinea cruris 8/13/2012    Tinnitus 12/10/2013    Type II or unspecified type diabetes mellitus without mention of complication, not stated as uncontrolled     Unintended weight loss 7/23/2015    UTI (urinary tract infection) 3/1/2013    Valvular heart disease 6/26/2015    Wheezy bronchitis 7/22/2014     Past Surgical History:   Procedure Laterality Date    ANGIOPLASTY  1996    BACK SURGERY  12/2002   [facility] CARDIAC VALVE SURGERY  3/24/15    Christopher Prado CATARACT REMOVAL  2010    X2    COLONOSCOPY  09/07/2016    DR. WALDROP    CORONARY ANGIOPLASTY WITH STENT PLACEMENT  11/2007    X2    CORONARY ARTERY BYPASS GRAFT  3/24/15    Dr Christopher Prado POLYPECTOMY  09/2016    7 colon polyps removed    TOTAL ANKLE ARTHROPLASTY  5/25/11    R    TOTAL KNEE ARTHROPLASTY  1998    LEFT     Family History   Problem Relation Age of Onset    High Blood Pressure Father      Social History     Social History    Marital status:      Spouse name: N/A    Number of children: N/A    Years of education: N/A     Occupational History    Not on file.      Social History Main Topics    Smoking status: Never Smoker    Smokeless tobacco: Never Used    Alcohol use No    Drug use: No    Sexual activity: Not on file     Other Topics Concern    Not on file     Social History Narrative    No narrative on file     Allergies:  Codeine    Review of

## 2018-07-13 ENCOUNTER — HOSPITAL ENCOUNTER (OUTPATIENT)
Dept: CT IMAGING | Age: 83
Discharge: HOME | End: 2018-07-15
Payer: MEDICARE

## 2018-07-13 DIAGNOSIS — G45.3 AMAUROSIS FUGAX: ICD-10-CM

## 2018-07-13 DIAGNOSIS — G45.9 TRANSIENT CEREBRAL ISCHEMIA, UNSPECIFIED TYPE: ICD-10-CM

## 2018-07-13 PROCEDURE — 70450 CT HEAD/BRAIN W/O DYE: CPT

## 2018-07-13 NOTE — CARE COORDINATION
Provider Laurence Dobbins   [facility]2018 [facility] [address] [facility]   7/18/2018 8:15 AM [facility] [address]

## 2018-07-16 ENCOUNTER — CARE COORDINATION (OUTPATIENT)
Dept: CARE COORDINATION | Age: 83
End: 2018-07-16

## 2018-07-18 ENCOUNTER — HOSPITAL ENCOUNTER (OUTPATIENT)
Dept: PHARMACY | Age: 83
Setting detail: THERAPIES SERIES
Discharge: HOME | End: 2018-07-18
Payer: MEDICARE

## 2018-07-18 DIAGNOSIS — I48.91 ATRIAL FIBRILLATION, UNSPECIFIED TYPE (HCC): ICD-10-CM

## 2018-07-18 LAB
INR BLD: 4.8
PROTIME: 57.2 SECONDS

## 2018-07-18 PROCEDURE — 99211 OFF/OP EST MAY X REQ PHY/QHP: CPT

## 2018-07-18 PROCEDURE — 85610 PROTHROMBIN TIME: CPT

## 2018-07-18 NOTE — PROGRESS NOTES
Mr. Reta Garcia is a 81 y.o. y/o male with history of Afib who presents today for anticoagulation monitoring and adjustment.   INR 4.8 is supratherapeutic for this patient (goal range 2-3) and is reflective of 27.5 mg TWD  Patient verifies current dosing regimen, patient able to verbally recall dose  Patient reports no  missed doses since last INR   Patient denies s/sx clotting and/or stroke  Patient denies hematuria, epistaxis, rectal bleeding  Patient denies changes in diet, alcohol, or tobacco use  Reviewed medication list and drug allergies with patient, updated any medication additions or modifications accordingly  Patient also denies any pending medical or dental procedures scheduled at this time  Patient was instructed to hold for the next 2 days as patient already took warfarin this morning, then reduce TWD to 25 mg (9.1%) and RTC 2 weeks    Meghna CraneD  Staff Pharmacist  7/18/2018 8:34 AM

## 2018-07-20 RX ORDER — WARFARIN SODIUM 2.5 MG/1
TABLET ORAL
Qty: 180 TABLET | Refills: 1 | Status: SHIPPED | OUTPATIENT
Start: 2018-07-20 | End: 2018-08-15 | Stop reason: SDUPTHER

## 2018-07-23 ENCOUNTER — HOSPITAL ENCOUNTER (OUTPATIENT)
Dept: ULTRASOUND IMAGING | Age: 83
Discharge: HOME | End: 2018-07-25
Payer: MEDICARE

## 2018-07-23 DIAGNOSIS — G45.3 AMAUROSIS FUGAX: ICD-10-CM

## 2018-07-23 DIAGNOSIS — G45.9 TRANSIENT CEREBRAL ISCHEMIA, UNSPECIFIED TYPE: ICD-10-CM

## 2018-07-23 PROCEDURE — 93880 EXTRACRANIAL BILAT STUDY: CPT

## 2018-07-24 ENCOUNTER — CARE COORDINATION (OUTPATIENT)
Dept: CARE COORDINATION | Age: 83
End: 2018-07-24

## 2018-07-25 NOTE — CARE COORDINATION
Received call from [facility] stating he has not received diabetic testing supplies. Call to [employer] and rx faxed was not dated. New rx completed and faxed back to them. Call to [facility] to verify rx has been approved.  They will call Andrew Sarmiento to place the order

## 2018-07-31 LAB — GLUCOSE BLD-MCNC: 133 MG/DL (ref 70–100)

## 2018-08-01 ENCOUNTER — TELEPHONE (OUTPATIENT)
Dept: PHARMACY | Age: 83
End: 2018-08-01

## 2018-08-01 ENCOUNTER — APPOINTMENT (OUTPATIENT)
Dept: PHARMACY | Age: 83
End: 2018-08-01
Payer: MEDICARE

## 2018-08-01 DIAGNOSIS — I48.91 ATRIAL FIBRILLATION, UNSPECIFIED TYPE (HCC): ICD-10-CM

## 2018-08-01 LAB
GLUCOSE BLD-MCNC: 124 MG/DL (ref 70–100)
GLUCOSE BLD-MCNC: 126 MG/DL (ref 70–100)
GLUCOSE BLD-MCNC: 156 MG/DL (ref 70–100)

## 2018-08-01 NOTE — TELEPHONE ENCOUNTER
Pt in the hospital.    Follow up in 1 week.     [facility]  Staff Pharmacist  8/1/2018  10:48 AM

## 2018-08-02 LAB
GLUCOSE BLD-MCNC: 109 MG/DL (ref 70–100)
GLUCOSE BLD-MCNC: 109 MG/DL (ref 70–100)
GLUCOSE BLD-MCNC: 125 MG/DL (ref 70–100)
GLUCOSE BLD-MCNC: 143 MG/DL (ref 70–100)

## 2018-08-03 LAB
GLUCOSE BLD-MCNC: 113 MG/DL (ref 70–100)
GLUCOSE BLD-MCNC: 123 MG/DL (ref 70–100)
GLUCOSE BLD-MCNC: 124 MG/DL (ref 70–100)
GLUCOSE BLD-MCNC: 153 MG/DL (ref 70–100)

## 2018-08-04 LAB
GLUCOSE BLD-MCNC: 102 MG/DL (ref 70–100)
GLUCOSE BLD-MCNC: 105 MG/DL (ref 70–100)
GLUCOSE BLD-MCNC: 111 MG/DL (ref 70–100)
GLUCOSE BLD-MCNC: 117 MG/DL (ref 70–100)

## 2018-08-05 LAB
GLUCOSE BLD-MCNC: 129 MG/DL (ref 70–100)
GLUCOSE BLD-MCNC: 88 MG/DL (ref 70–100)
GLUCOSE BLD-MCNC: 89 MG/DL (ref 70–100)
GLUCOSE BLD-MCNC: 91 MG/DL (ref 70–100)

## 2018-08-06 LAB
GLUCOSE BLD-MCNC: 109 MG/DL (ref 70–100)
GLUCOSE BLD-MCNC: 155 MG/DL (ref 70–100)
GLUCOSE BLD-MCNC: 174 MG/DL (ref 70–100)
GLUCOSE BLD-MCNC: 67 MG/DL (ref 70–100)
GLUCOSE BLD-MCNC: 90 MG/DL (ref 70–100)

## 2018-08-07 ENCOUNTER — CARE COORDINATION (OUTPATIENT)
Dept: CARE COORDINATION | Age: 83
End: 2018-08-07

## 2018-08-07 LAB
GLUCOSE BLD-MCNC: 105 MG/DL (ref 70–100)
GLUCOSE BLD-MCNC: 154 MG/DL (ref 70–100)

## 2018-08-08 ENCOUNTER — CARE COORDINATION (OUTPATIENT)
Dept: CASE MANAGEMENT | Age: 83
End: 2018-08-08

## 2018-08-08 ENCOUNTER — OFFICE VISIT (OUTPATIENT)
Dept: PRIMARY CARE CLINIC | Age: 83
End: 2018-08-08
Payer: MEDICARE

## 2018-08-08 VITALS
DIASTOLIC BLOOD PRESSURE: 64 MMHG | RESPIRATION RATE: 19 BRPM | HEART RATE: 70 BPM | SYSTOLIC BLOOD PRESSURE: 124 MMHG | TEMPERATURE: 98.2 F | OXYGEN SATURATION: 95 %

## 2018-08-08 DIAGNOSIS — M54.5 ACUTE BILATERAL LOW BACK PAIN, WITH SCIATICA PRESENCE UNSPECIFIED: ICD-10-CM

## 2018-08-08 DIAGNOSIS — J44.9 CHRONIC OBSTRUCTIVE PULMONARY DISEASE, UNSPECIFIED COPD TYPE (HCC): Primary | ICD-10-CM

## 2018-08-08 DIAGNOSIS — R39.9 UTI SYMPTOMS: Primary | ICD-10-CM

## 2018-08-08 DIAGNOSIS — R41.82 ALTERED MENTAL STATUS, UNSPECIFIED ALTERED MENTAL STATUS TYPE: ICD-10-CM

## 2018-08-08 DIAGNOSIS — H93.8X3 EAR CONGESTION, BILATERAL: ICD-10-CM

## 2018-08-08 PROCEDURE — G8598 ASA/ANTIPLAT THER USED: HCPCS | Performed by: PHYSICIAN ASSISTANT

## 2018-08-08 PROCEDURE — 99213 OFFICE O/P EST LOW 20 MIN: CPT | Performed by: PHYSICIAN ASSISTANT

## 2018-08-08 PROCEDURE — 1123F ACP DISCUSS/DSCN MKR DOCD: CPT | Performed by: PHYSICIAN ASSISTANT

## 2018-08-08 PROCEDURE — 1036F TOBACCO NON-USER: CPT | Performed by: PHYSICIAN ASSISTANT

## 2018-08-08 PROCEDURE — G8417 CALC BMI ABV UP PARAM F/U: HCPCS | Performed by: PHYSICIAN ASSISTANT

## 2018-08-08 PROCEDURE — 1101F PT FALLS ASSESS-DOCD LE1/YR: CPT | Performed by: PHYSICIAN ASSISTANT

## 2018-08-08 PROCEDURE — G8427 DOCREV CUR MEDS BY ELIG CLIN: HCPCS | Performed by: PHYSICIAN ASSISTANT

## 2018-08-08 PROCEDURE — 1111F DSCHRG MED/CURRENT MED MERGE: CPT | Performed by: FAMILY MEDICINE

## 2018-08-08 PROCEDURE — 4040F PNEUMOC VAC/ADMIN/RCVD: CPT | Performed by: PHYSICIAN ASSISTANT

## 2018-08-08 RX ORDER — BUPROPION HYDROCHLORIDE 150 MG/1
150 TABLET ORAL EVERY MORNING
COMMUNITY
End: 2018-12-26 | Stop reason: ALTCHOICE

## 2018-08-08 RX ORDER — QUETIAPINE FUMARATE 25 MG/1
25 TABLET, FILM COATED ORAL DAILY
COMMUNITY
End: 2018-12-26 | Stop reason: ALTCHOICE

## 2018-08-08 RX ORDER — TRIAMTERENE AND HYDROCHLOROTHIAZIDE 37.5; 25 MG/1; MG/1
1 TABLET ORAL DAILY
COMMUNITY
End: 2018-08-15 | Stop reason: ALTCHOICE

## 2018-08-08 RX ORDER — PANTOPRAZOLE SODIUM 40 MG/1
TABLET, DELAYED RELEASE ORAL
Qty: 30 TABLET | Refills: 10 | Status: SHIPPED | OUTPATIENT
Start: 2018-08-08

## 2018-08-08 RX ORDER — DIAZEPAM 5 MG/1
5 TABLET ORAL EVERY 8 HOURS PRN
COMMUNITY
End: 2018-08-14 | Stop reason: SDUPTHER

## 2018-08-08 RX ORDER — IPRATROPIUM BROMIDE 21 UG/1
2 SPRAY, METERED NASAL 2 TIMES DAILY
Qty: 1 BOTTLE | Refills: 3 | Status: SHIPPED | OUTPATIENT
Start: 2018-08-08

## 2018-08-08 NOTE — CARE COORDINATION
[facility] 45 Transitions Initial Follow Up Call    Call within 2 business days of discharge: Yes    Patient: Sina Marroquin Patient : 1935   MRN: [id number]  Reason for Admission: -2018 [facility] Altered mental status. Discharge Date:   RARS: No Data Recorded   PHP Plan: Medicare  PCP: Kirstin Harrington, DO  ACC: THIEN Tomas RN     Spoke with: Delfin's dtr, Maria Luz Luis, reports pt had progressively increased confusion over a couple of days. Spouse reported a home fall. Pt was complaining of penile discomfort and occasional incontinence. He is uncircumcised. He had a right back tooth recently pulled and developed right ear and neck pain that became progressively worse so went to ED. [facility] ED gave pt Fentanyl and had to administer Narcan. Dtr states it was mentioned to her that his CO2 was elevated. He has Bipap ordered through [employer] and waiting delivery. Dtr states no one discussed possible UTI or right ear infection but pt can't fit his hearing aide into the right ear and continues to have some urinary incontinence. TC appt  10:45. I did advise pt to go to [address] [place] [address] if symptoms persist. He was not DC on any antibiotics. Family does not note any fevers, chills, urinary pain, or observable right neck/facial/ear swelling. Maria Luz Luis feels pt confusion currently at baseline. Reports pt can't fit hearing aide into right ear so concern with ear canal swelling. No current respiratory concerns. He is using O2 2-3L NC continuous. Tolerating food/fluids. [facility] visit scheduled for tomorrow. PCP advised of current symptoms. I did notify Ritchie Solorzano RN CTC at [facility], of situation with request for labs/test results as available. Pt advised to return to ED for any progressive symptoms. Has FU appt w/ Dr Maria Ines Phillips. [facility] to resume services. Med rec & 1111F completed.     Non-face-to-face services provided:  Obtained and reviewed discharge summary and/or continuity of care documents  Education of

## 2018-08-08 NOTE — PROGRESS NOTES
nystatin (MYCOSTATIN) 276862 UNIT/GM powder Apply topically 4 times daily. 1 Bottle 3    simvastatin (ZOCOR) 20 MG tablet TAKE 1 TABLET BY MOUTH DAILY. 30 tablet 5    metoprolol tartrate (LOPRESSOR) 25 MG tablet TAKE 1/2 TABLET TWICE A DAY 60 tablet 5    fluticasone-salmeterol (ADVAIR HFA) 115-21 MCG/ACT inhaler INHALE 2 PUFFS INTO THE LUNGS EVERY 12 HOURS RINSE MOUTH AFTER USE 12 Inhaler 3    PARoxetine (PAXIL) 20 MG tablet Take 1 tablet by mouth daily 30 tablet 3    ipratropium-albuterol (DUONEB) 0.5-2.5 (3) MG/3ML SOLN nebulizer solution Inhale 3 mLs into the lungs every 4 hours (Patient taking differently: Inhale 1 vial into the lungs every 4 hours as needed ) 360 mL 3    finasteride (PROSCAR) 5 MG tablet TAKE 1 TABLET BY MOUTH DAILY. 30 tablet 10    ascorbic acid ([employer]S VITAMIN C) 1000 MG tablet Take 1,000 mg by mouth 2 times daily      magnesium oxide (MAG-OX) 400 MG tablet Take 400 mg by mouth daily       nitroGLYCERIN (NITROSTAT) 0.4 MG SL tablet Place 0.4 mg under the tongue every 5 minutes as needed for Chest pain Dissolve 1 tablet under tongue for chest pain and repeat every 5 min up to max of 3 total doses. If no relief after 3 doses call 911      aspirin 81 MG EC tablet Take 81 mg by mouth daily. Current Facility-Administered Medications   Medication Dose Route Frequency Provider Last Rate Last Dose    triamcinolone acetonide (KENALOG-40) injection 80 mg  80 mg Intra-articular Once Haley Ceja DO           Review of Systems   Constitutional: Negative for chills and fever. HENT: Negative for ear discharge and ear pain. Respiratory: Negative for cough and shortness of breath. Genitourinary: Negative for discharge, dysuria, frequency, hematuria and scrotal swelling.        Objective    Vitals:    08/08/18 1659   BP: 124/64   Pulse: 70   Resp: 19   Temp: 98.2 °F (36.8 °C)   TempSrc: Tympanic   SpO2: 95%       Physical Exam   Constitutional: He appears well-developed and well-nourished. No distress. HENT:   Head: Normocephalic and atraumatic. Right Ear: External ear normal.   Left Ear: External ear normal.   Nose: Nose normal.   Mouth/Throat: Oropharynx is clear and moist.   Eyes: Conjunctivae are normal. Right eye exhibits no discharge. Left eye exhibits no discharge. Cardiovascular: Normal rate, regular rhythm and normal heart sounds. Pulmonary/Chest: Effort normal and breath sounds normal.   Abdominal: Soft. Bowel sounds are normal. He exhibits no distension and no mass. There is no tenderness. There is no rebound, no guarding and no CVA tenderness. Lymphadenopathy:     He has no cervical adenopathy. Skin: He is not diaphoretic. Vitals reviewed. Assessment and Plan      ICD-10-CM ICD-9-CM    1. UTI symptoms R39.9 788.99 POCT Urinalysis no Micro      Urine Culture   2. Ear congestion, bilateral H93.8X3 388.8 ipratropium (ATROVENT) 0.03 % nasal spray       Orders Placed This Encounter   Procedures    Urine Culture     Standing Status:   Future     Number of Occurrences:   1     Standing Expiration Date:   2019     Order Specific Question:   Specify (ex-cath, midstream, cysto, etc)? Answer:   midstream    POCT Urinalysis no Micro       Orders Placed This Encounter   Medications    ipratropium (ATROVENT) 0.03 % nasal spray     Si sprays by Nasal route 2 times daily     Dispense:  1 Bottle     Refill:  3       Reviewed with the patient: current clinical status, medications, activities and diet. Side effects, adverse effects of the medication prescribed today, as well as treatment plan and result expectations have been discussed with the patient who expresses understanding and desires to proceed. Close follow up to evaluate treatment results and for coordination of care. I have reviewed the patient's medical history in detail and updated the computerized patient record.     Return if symptoms worsen or fail to improve, for follow up with

## 2018-08-09 DIAGNOSIS — R39.9 UTI SYMPTOMS: ICD-10-CM

## 2018-08-09 ASSESSMENT — ENCOUNTER SYMPTOMS
SHORTNESS OF BREATH: 0
COUGH: 0

## 2018-08-10 ENCOUNTER — TELEPHONE (OUTPATIENT)
Dept: PRIMARY CARE CLINIC | Age: 83
End: 2018-08-10

## 2018-08-11 LAB — URINE CULTURE, ROUTINE: NORMAL

## 2018-08-13 ENCOUNTER — TELEPHONE (OUTPATIENT)
Dept: PRIMARY CARE CLINIC | Age: 83
End: 2018-08-13

## 2018-08-14 ENCOUNTER — CARE COORDINATION (OUTPATIENT)
Dept: CARE COORDINATION | Age: 83
End: 2018-08-14

## 2018-08-14 ENCOUNTER — OFFICE VISIT (OUTPATIENT)
Dept: PRIMARY CARE CLINIC | Age: 83
End: 2018-08-14
Payer: MEDICARE

## 2018-08-14 VITALS
WEIGHT: 188.8 LBS | DIASTOLIC BLOOD PRESSURE: 54 MMHG | SYSTOLIC BLOOD PRESSURE: 84 MMHG | OXYGEN SATURATION: 97 % | TEMPERATURE: 97.7 F | BODY MASS INDEX: 27.96 KG/M2 | HEIGHT: 69 IN | HEART RATE: 81 BPM

## 2018-08-14 DIAGNOSIS — R29.6 MULTIPLE FALLS: Primary | ICD-10-CM

## 2018-08-14 DIAGNOSIS — N40.0 BENIGN PROSTATIC HYPERPLASIA, UNSPECIFIED WHETHER LOWER URINARY TRACT SYMPTOMS PRESENT: ICD-10-CM

## 2018-08-14 DIAGNOSIS — I48.91 ATRIAL FIBRILLATION, UNSPECIFIED TYPE (HCC): ICD-10-CM

## 2018-08-14 DIAGNOSIS — R29.6 MULTIPLE FALLS: ICD-10-CM

## 2018-08-14 DIAGNOSIS — R05.9 COUGH: ICD-10-CM

## 2018-08-14 DIAGNOSIS — M25.511 ACUTE PAIN OF RIGHT SHOULDER: ICD-10-CM

## 2018-08-14 DIAGNOSIS — H93.13 TINNITUS OF BOTH EARS: ICD-10-CM

## 2018-08-14 DIAGNOSIS — I95.1 ORTHOSTATIC HYPOTENSION: ICD-10-CM

## 2018-08-14 DIAGNOSIS — E11.8 TYPE 2 DIABETES MELLITUS WITH COMPLICATION, WITHOUT LONG-TERM CURRENT USE OF INSULIN (HCC): ICD-10-CM

## 2018-08-14 LAB
HBA1C MFR BLD: 5.4 % (ref 4.8–5.9)
INR BLD: 5.5
PROTHROMBIN TIME: 58.2 SEC (ref 9.6–12.3)

## 2018-08-14 PROCEDURE — 99496 TRANSJ CARE MGMT HIGH F2F 7D: CPT | Performed by: FAMILY MEDICINE

## 2018-08-14 PROCEDURE — 1111F DSCHRG MED/CURRENT MED MERGE: CPT | Performed by: FAMILY MEDICINE

## 2018-08-14 RX ORDER — DIAZEPAM 5 MG/1
2.5 TABLET ORAL EVERY 8 HOURS PRN
Qty: 30 TABLET | Refills: 0 | COMMUNITY
Start: 2018-08-14 | End: 2018-09-21 | Stop reason: DRUGHIGH

## 2018-08-14 ASSESSMENT — ENCOUNTER SYMPTOMS
SORE THROAT: 0
COUGH: 0
STRIDOR: 0
DIARRHEA: 0
CONSTIPATION: 0
EYE PAIN: 0
CHANGE IN BOWEL HABIT: 0
APNEA: 0
PHOTOPHOBIA: 0
EYE REDNESS: 0
CHOKING: 0
VISUAL CHANGE: 0
ABDOMINAL PAIN: 0
VOMITING: 0
FACIAL SWELLING: 0
WHEEZING: 0
CHEST TIGHTNESS: 0
COLOR CHANGE: 0
NAUSEA: 0
EYE DISCHARGE: 0
SWOLLEN GLANDS: 0

## 2018-08-14 NOTE — PROGRESS NOTES
Post-Discharge Transitional Care Management Services or Hospital Follow Up      Christine Swanson   YOB: 1935    Date of Office Visit:  8/14/2018  Date of Hospital Admission:    Date of Hospital Discharge:    Readmission Risk Score(high >=14%. Medium >=10%):No Data Recorded    Care management risk score Rising risk (score 2-5) and Complex Care (Scores >=6): 14     Non face to face  following discharge, date last encounter closed (first attempt may have been earlier): 8/8/2018  4:01 PM 8/8/2018  4:01 PM    Call initiated 2 business days of discharge: Yes     Patient Active Problem List   Diagnosis    Osteoarthritis    Hypertension    CAD (coronary artery disease)    Hyperlipidemia    Rib lesion    Mesothelioma ([address]    Peripheral neuropathy    Pleural thickening    Abnormal CT of the chest    Type 2 diabetes mellitus with complication, without long-term current use of insulin ([address]    BPH (benign prostatic hyperplasia)    Screening for prostate cancer    Tinea cruris    Low back pain    UTI (urinary tract infection)    ?  Prostatitis    Hematuria    Pyuria    Cardiac murmur    Tinnitus    B12 deficiency    Right hip pain    Arm mass    Anterolisthesis, L-4 on L-5, significant    Noncompliance, w/ GI prior    Rectal bleeding    Bronchitis    Wheezy bronchitis    Squamous cell carcinoma in situ of skin, L neck    A-fib (HCC)    Systolic murmur    Valvular heart disease    Orthostatic hypotension    Unintended weight loss    Ecchymosis- L foot dorsum    Acquired spondylolisthesis of lumbosacral region    Lumbar stenosis with neurogenic claudication    Warfarin anticoagulation    Spondylosis of lumbar region without myelopathy or radiculopathy    Fall (on) incline, sequela, Velcro shoes stuck together    Diverticulosis    Cervical spondylosis without myelopathy    Auditory hallucinations, recent Narcotics from Dentist    Gynecomastia, male    Seborrheic powder  Apply topically 4 times daily. pantoprazole (PROTONIX) 40 MG tablet  TAKE 1 TABLET BY MOUTH DAILY             PARoxetine (PAXIL) 20 MG tablet  Take 1 tablet by mouth daily             QUEtiapine (SEROQUEL) 25 MG tablet  Take 25 mg by mouth daily             simvastatin (ZOCOR) 20 MG tablet  TAKE 1 TABLET BY MOUTH DAILY. triamterene-hydrochlorothiazide (MAXZIDE-25) 37.5-25 MG per tablet  Take 1 tablet by mouth daily             warfarin (COUMADIN) 2.5 MG tablet  TAKE 2 TABLETS BY MOUTH EVERY DAY OR AS DIRECTED BY CLINIC                   Medications marked \"taking\" at this time  Outpatient Prescriptions Marked as Taking for the 8/14/18 encounter (Office Visit) with Yasir Watts, DO   Medication Sig Dispense Refill    pantoprazole (PROTONIX) 40 MG tablet TAKE 1 TABLET BY MOUTH DAILY 30 tablet 10    triamterene-hydrochlorothiazide (MAXZIDE-25) 37.5-25 MG per tablet Take 1 tablet by mouth daily      diazepam (VALIUM) 5 MG tablet Take 5 mg by mouth every 8 hours as needed for Anxiety. Sebastian Pérez metFORMIN (GLUCOPHAGE) 500 MG tablet Take 250 mg by mouth daily      buPROPion (WELLBUTRIN XL) 150 MG extended release tablet Take 150 mg by mouth every morning      ipratropium (ATROVENT) 0.03 % nasal spray 2 sprays by Nasal route 2 times daily 1 Bottle 3    warfarin (COUMADIN) 2.5 MG tablet TAKE 2 TABLETS BY MOUTH EVERY DAY OR AS DIRECTED BY CLINIC (Patient taking differently: TAKE 2 TABLETS BY MOUTH DAILY ON C-R-G-SAT-SUN. TAKE 1 TABLET DAILY ON TUE & THUR.) 180 tablet 1    simvastatin (ZOCOR) 20 MG tablet TAKE 1 TABLET BY MOUTH DAILY.  30 tablet 5    metoprolol tartrate (LOPRESSOR) 25 MG tablet TAKE 1/2 TABLET TWICE A DAY 60 tablet 5    fluticasone-salmeterol (ADVAIR HFA) 115-21 MCG/ACT inhaler INHALE 2 PUFFS INTO THE LUNGS EVERY 12 HOURS RINSE MOUTH AFTER USE 12 Inhaler 3    PARoxetine (PAXIL) 20 MG tablet Take 1 tablet by mouth daily 30 tablet 3    ipratropium-albuterol (DUONEB) 0.5-2.5 (3) MG/3ML SOLN nebulizer solution Inhale 3 mLs into the lungs every 4 hours (Patient taking differently: Inhale 1 vial into the lungs every 4 hours as needed ) 360 mL 3    finasteride (PROSCAR) 5 MG tablet TAKE 1 TABLET BY MOUTH DAILY. 30 tablet 10    ascorbic acid ([employer]S VITAMIN C) 1000 MG tablet Take 1,000 mg by mouth 2 times daily      magnesium oxide (MAG-OX) 400 MG tablet Take 400 mg by mouth daily       nitroGLYCERIN (NITROSTAT) 0.4 MG SL tablet Place 0.4 mg under the tongue every 5 minutes as needed for Chest pain Dissolve 1 tablet under tongue for chest pain and repeat every 5 min up to max of 3 total doses. If no relief after 3 doses call 911      aspirin 81 MG EC tablet Take 81 mg by mouth daily. Medications patient taking as of now reconciled against medications ordered at time of hospital discharge: Yes    Chief Complaint   Patient presents with   [address] from Hospital     Pt. is here today to follow up on a hospital stay 7/31-8/7 at [address] [place] [address]. He was dizzy and and his CO2 was 160.  could not find any issues states the daughter, they put him in ICU, he was not eating,sleeping for 3 days. They gave him fentanyl then had to give him Narcan due to OD status at that time. He had slide down the dresser and has bilateral brusing on his arms. 7/12/18 he had come in for transient cerebral ischemia. It was thought he had a UTI and it was negative. still dizzy and weak.  Arm Injury     Pt. can not move his right arm/ shoulder limited ROM 10/10 pain level. He has used Patch analgesic        Arm Injury   This is a new problem. The current episode started in the past 7 days (Saturday, 08/11/18 (2nd fall)). The problem occurs constantly. The problem has been unchanged. Associated symptoms include arthralgias (right shoulder), urinary symptoms, vertigo and weakness.  Pertinent negatives include no abdominal pain, anorexia, change in bowel habit, chest pain, chills, congestion,

## 2018-08-15 ENCOUNTER — CARE COORDINATION (OUTPATIENT)
Dept: CARE COORDINATION | Age: 83
End: 2018-08-15

## 2018-08-15 ENCOUNTER — TELEPHONE (OUTPATIENT)
Dept: PHARMACY | Age: 83
End: 2018-08-15

## 2018-08-15 ENCOUNTER — TELEPHONE (OUTPATIENT)
Dept: PRIMARY CARE CLINIC | Age: 83
End: 2018-08-15

## 2018-08-15 RX ORDER — WARFARIN SODIUM 2.5 MG/1
TABLET ORAL
Qty: 180 TABLET | Refills: 1 | COMMUNITY
Start: 2018-08-15 | End: 2019-10-07 | Stop reason: SDUPTHER

## 2018-08-15 NOTE — TELEPHONE ENCOUNTER
Tamara from [facility] is reporting abnormal blood pressure readings. Sitting 86/54, standing is 60/30. No metoprolol taken while this occurred. Her # is [phone].

## 2018-08-15 NOTE — TELEPHONE ENCOUNTER
Spoke with Reyna's daughter who takes notes for Enedelia about Stevie's care. Naya Marion went in to Dr. Tran El office today and they changed his coumadin dosing. They do not believe he will be managing indefinitely.  He goes back to Dr. Tran El tomorrow so they will ask what is going on with his coumadin

## 2018-08-16 ENCOUNTER — TELEPHONE (OUTPATIENT)
Dept: PHARMACY | Age: 83
End: 2018-08-16

## 2018-08-16 DIAGNOSIS — I48.91 ATRIAL FIBRILLATION, UNSPECIFIED TYPE (HCC): ICD-10-CM

## 2018-08-16 NOTE — TELEPHONE ENCOUNTER
Spoke to Reyna's daughter Ramírez Gomez re: message from [address],      \"Per [address], do not take coumadin today. Cut dose to 2 tablets on Monday and Thursday and take 1 tablet daily on other 5 days. Dose was decreased from 25mg TWD to 22.5mg TWD (10% decrease)      Also, follow up with coumadin clinic within one week. \"      Per note from Dr. Kevin Triplett office from 8/15/18. Spoke with daughter, Chava Tam -- patient is currently on home health care and INR results are being managed by Dr. Destin Grant. Per daughter, patient to be on [facility] for only a few more weeks. Stated if we do not receive a status update before 2 weeks, will reach out to patient again to schedule. While patient is on [facility], Dr. Destin Grant is managing dosing. 8/16/18 -- updated INR tracker to reflect the updated dosing from Dr. Destin Grant.      Maia Orr, PharmD   8/16/2018 11:34 AM

## 2018-08-16 NOTE — CARE COORDINATION
Received call from daughter pamela. She reports stefan is fatigued and requesting to change tomorrow dr lindo until next week. Discussed with pcp. Results of testing reviewed with pamela and belgica rescheduled for next week . Medication regimen also reviewed with her per her request. He is to hold metoprolol and maxide. Coumadin dose also reviewed.  He continues with [facility] and therapy did treatment today

## 2018-08-17 ENCOUNTER — TELEPHONE (OUTPATIENT)
Dept: PRIMARY CARE CLINIC | Age: 83
End: 2018-08-17

## 2018-08-17 NOTE — TELEPHONE ENCOUNTER
Please hydrate and he sees nothing else on his med list that would drop his BP. Notified Inessa Ruiz and she told him to hydrate and had him eat a bowl of cheetos for the sodium.

## 2018-08-21 ENCOUNTER — CARE COORDINATION (OUTPATIENT)
Dept: CARE COORDINATION | Age: 83
End: 2018-08-21

## 2018-08-21 ENCOUNTER — OFFICE VISIT (OUTPATIENT)
Dept: PRIMARY CARE CLINIC | Age: 83
End: 2018-08-21
Payer: MEDICARE

## 2018-08-21 VITALS
DIASTOLIC BLOOD PRESSURE: 60 MMHG | RESPIRATION RATE: 14 BRPM | SYSTOLIC BLOOD PRESSURE: 106 MMHG | BODY MASS INDEX: 28.29 KG/M2 | HEART RATE: 80 BPM | TEMPERATURE: 97.5 F | WEIGHT: 191 LBS | HEIGHT: 69 IN

## 2018-08-21 DIAGNOSIS — I10 ESSENTIAL HYPERTENSION: Primary | ICD-10-CM

## 2018-08-21 DIAGNOSIS — J98.11 ATELECTASIS: ICD-10-CM

## 2018-08-21 DIAGNOSIS — M25.511 ACUTE PAIN OF RIGHT SHOULDER: ICD-10-CM

## 2018-08-21 DIAGNOSIS — J44.9 CHRONIC OBSTRUCTIVE PULMONARY DISEASE, UNSPECIFIED COPD TYPE (HCC): ICD-10-CM

## 2018-08-21 DIAGNOSIS — M75.01 ADHESIVE CAPSULITIS OF RIGHT SHOULDER: ICD-10-CM

## 2018-08-21 PROCEDURE — 20610 DRAIN/INJ JOINT/BURSA W/O US: CPT | Performed by: FAMILY MEDICINE

## 2018-08-21 PROCEDURE — 1101F PT FALLS ASSESS-DOCD LE1/YR: CPT | Performed by: FAMILY MEDICINE

## 2018-08-21 PROCEDURE — 1036F TOBACCO NON-USER: CPT | Performed by: FAMILY MEDICINE

## 2018-08-21 PROCEDURE — G8598 ASA/ANTIPLAT THER USED: HCPCS | Performed by: FAMILY MEDICINE

## 2018-08-21 PROCEDURE — 3023F SPIROM DOC REV: CPT | Performed by: FAMILY MEDICINE

## 2018-08-21 PROCEDURE — G8427 DOCREV CUR MEDS BY ELIG CLIN: HCPCS | Performed by: FAMILY MEDICINE

## 2018-08-21 PROCEDURE — G8417 CALC BMI ABV UP PARAM F/U: HCPCS | Performed by: FAMILY MEDICINE

## 2018-08-21 PROCEDURE — G8926 SPIRO NO PERF OR DOC: HCPCS | Performed by: FAMILY MEDICINE

## 2018-08-21 PROCEDURE — 99214 OFFICE O/P EST MOD 30 MIN: CPT | Performed by: FAMILY MEDICINE

## 2018-08-21 PROCEDURE — 4040F PNEUMOC VAC/ADMIN/RCVD: CPT | Performed by: FAMILY MEDICINE

## 2018-08-21 PROCEDURE — 1123F ACP DISCUSS/DSCN MKR DOCD: CPT | Performed by: FAMILY MEDICINE

## 2018-08-21 RX ORDER — TRIAMCINOLONE ACETONIDE 40 MG/ML
80 INJECTION, SUSPENSION INTRA-ARTICULAR; INTRAMUSCULAR ONCE
Status: COMPLETED | OUTPATIENT
Start: 2018-08-21 | End: 2018-08-21

## 2018-08-21 RX ADMIN — TRIAMCINOLONE ACETONIDE 80 MG: 40 INJECTION, SUSPENSION INTRA-ARTICULAR; INTRAMUSCULAR at 12:55

## 2018-08-21 ASSESSMENT — ENCOUNTER SYMPTOMS
BLURRED VISION: 0
EYE DISCHARGE: 0
ABDOMINAL PAIN: 0
APNEA: 0
EYE REDNESS: 0
SHORTNESS OF BREATH: 0
PHOTOPHOBIA: 0
ORTHOPNEA: 0
CHOKING: 0
CONSTIPATION: 0
FACIAL SWELLING: 0
STRIDOR: 0
NAUSEA: 0
EYE PAIN: 0
DIARRHEA: 0
WHEEZING: 0
COLOR CHANGE: 0
CHEST TIGHTNESS: 0

## 2018-08-21 NOTE — PROGRESS NOTES
Subjective:      Patient ID: Augustin Chowdhury is a 81 y.o. male who presents today for:  Chief Complaint   Patient presents with    Fall     Pt is here today for a follow up on multiple falls he has been having. No falls since last visit, no dizziness since last thursday. He is still experiencing right shoulder. He has been using Tylenol, Pain patches OTC, and ice to ease the pain. 10/10 Burning Pain rate        Hypertension   This is a chronic problem. The current episode started more than 1 year ago. The problem has been gradually improving since onset. The problem is controlled. Pertinent negatives include no anxiety, blurred vision, chest pain, headaches, malaise/fatigue, neck pain, orthopnea, palpitations, peripheral edema, PND, shortness of breath or sweats. Risk factors for coronary artery disease include dyslipidemia and male gender. Treatments tried: various, discontinued all treatment last visit. The current treatment provides moderate improvement. There are no compliance problems. Patient is also using oxygen and would benefit from a oxygen concentrator so that he can easily leave home for longer periods of time. Past Medical History:   Diagnosis Date    ? Prostatitis 3/1/2013    A-fib ([facility] [address] 6/5/2015    Anterolisthesis, L-4 on L-5, significant 4/17/2014    Aortic stenosis 9/12    [facility] [place]    Arm mass 4/15/2014    Arm mass 4/15/2014    Auditory hallucinations, recent Narcotics from Dentist 4/8/2017    B12 deficiency 2/11/2014    BCC (basal cell carcinoma), face 8/30/2017    BPH (benign prostatic hyperplasia) 5/15/2012    Bronchitis 7/22/2014    CAD (coronary artery disease)     Cardiac murmur 11/26/2013    Depression 6/6/2018    Diverticulosis 09/07/2016    DR. WALDROP    DM (diabetes mellitus) ([facility] [address] 5/15/2012    Ecchymosis- L foot dorsum 12/10/2015    Fall (on) incline, sequela, Velcro shoes stuck together 5/31/2016    Hematuria 3/1/2013    Hyperlipidemia     Hypertension    

## 2018-08-22 NOTE — CARE COORDINATION
Completion Date: 12/28/2018              Prior to Admission medications    Medication Sig Start Date End Date Taking? Authorizing Provider   warfarin (COUMADIN) 2.5 MG tablet Take 2 tablets on Monday and Thursday and 1 tablet on other 5 days. 8/15/18   Cristobal Casanova DO   diazepam (VALIUM) 5 MG tablet Take 0.5 tablets by mouth every 8 hours as needed for Anxiety. . 8/14/18   Cristobal Casanova DO   pantoprazole (PROTONIX) 40 MG tablet TAKE 1 TABLET BY MOUTH DAILY 8/8/18   Cristobal Casanova DO   QUEtiapine (SEROQUEL) 25 MG tablet Take 25 mg by mouth daily    Historical Provider, MD   metFORMIN (GLUCOPHAGE) 500 MG tablet Take 250 mg by mouth daily    Historical Provider, MD   buPROPion (WELLBUTRIN XL) 150 MG extended release tablet Take 150 mg by mouth every morning    Historical Provider, MD   ipratropium (ATROVENT) 0.03 % nasal spray 2 sprays by Nasal route 2 times daily 8/8/18   POLA Chun   nystatin (MYCOSTATIN) 305372 UNIT/GM powder Apply topically 4 times daily. 6/6/18   Cristobal Casanova DO   simvastatin (ZOCOR) 20 MG tablet TAKE 1 TABLET BY MOUTH DAILY. 5/21/18   Jamie Templeton DO   fluticasone-salmeterol (ADVAIR HFA) 853-24 MCG/ACT inhaler INHALE 2 PUFFS INTO THE LUNGS EVERY 12 HOURS RINSE MOUTH AFTER USE 1/31/18   Jamie Templeton DO   PARoxetine (PAXIL) 20 MG tablet Take 1 tablet by mouth daily 1/17/18   Cristobal Casanova DO   ipratropium-albuterol (DUONEB) 0.5-2.5 (3) MG/3ML SOLN nebulizer solution Inhale 3 mLs into the lungs every 4 hours  Patient taking differently: Inhale 1 vial into the lungs every 4 hours as needed  12/27/17   Cristobal Casanova DO   finasteride (PROSCAR) 5 MG tablet TAKE 1 TABLET BY MOUTH DAILY.  11/20/17   Jamie Templeton DO   ascorbic acid ([employer] VITAMIN C) 1000 MG tablet Take 1,000 mg by mouth 2 times daily    Historical Provider, MD   magnesium oxide (MAG-OX) 400 MG tablet Take 400 mg by mouth daily     Historical Provider, MD   nitroGLYCERIN (NITROSTAT) 0.4 MG SL tablet Place 0.4 mg

## 2018-08-23 ENCOUNTER — HOSPITAL ENCOUNTER (OUTPATIENT)
Dept: PHARMACY | Age: 83
Setting detail: THERAPIES SERIES
Discharge: HOME | End: 2018-08-23
Payer: MEDICARE

## 2018-08-23 DIAGNOSIS — I48.91 ATRIAL FIBRILLATION, UNSPECIFIED TYPE (HCC): ICD-10-CM

## 2018-08-23 LAB
INR BLD: 4.3
PROTIME: 51 SECONDS

## 2018-08-23 PROCEDURE — 85610 PROTHROMBIN TIME: CPT | Performed by: PHARMACIST

## 2018-08-23 PROCEDURE — 99211 OFF/OP EST MAY X REQ PHY/QHP: CPT | Performed by: PHARMACIST

## 2018-08-23 NOTE — PROGRESS NOTES
Mr. Tej Lugo is a 81 y.o. y/o male with history of Afib who presents today for anticoagulation monitoring and adjustment. INR 4.3 is Supra-therapeutic for this patient (goal range 2-3) and is reflective of 22.5 mg TWD  Patient verifies current dosing regimen, patient able to verbally recall dose  Patient reports 0 missed doses since last INR   Patient denies s/sx clotting and/or stroke  Patient denies hematuria, epistaxis, rectal bleeding  Patient denies changes in diet, alcohol, or tobacco use  Reviewed medication list and drug allergies with patient, updated any medication additions or modifications accordingly  Patient also denies any pending medical or dental procedures scheduled at this time  Patient was instructed to hold todays dose (5mg) and continue 22.5 mg TWD and RTC 1 weeks      Pt has been in ICU [facility]) for 1 week due to high CO2. (Pt hospitalized on July 31st-aug 7th). Pt no longer takes wellbutrin and seroquel. Also, he has low BP so he was taken off all BP and water pills. Patient also has some bruising but states he bumps into things a lot and has a dog which causes some bruising. Patient is slowly getting his appetite back.

## 2018-08-30 ENCOUNTER — HOSPITAL ENCOUNTER (OUTPATIENT)
Dept: PHARMACY | Age: 83
Setting detail: THERAPIES SERIES
Discharge: HOME | End: 2018-08-30
Payer: MEDICARE

## 2018-08-30 DIAGNOSIS — I48.91 ATRIAL FIBRILLATION, UNSPECIFIED TYPE (HCC): ICD-10-CM

## 2018-08-30 LAB
INR BLD: 1.7
PROTIME: 20.4 SECONDS

## 2018-08-30 PROCEDURE — 99211 OFF/OP EST MAY X REQ PHY/QHP: CPT

## 2018-08-30 PROCEDURE — 85610 PROTHROMBIN TIME: CPT

## 2018-08-30 NOTE — PROGRESS NOTES
Mr. Nadja Cox is a 81 y.o. y/o male with history of Afib who presents today for anticoagulation monitoring and adjustment. INR 1.7 is sub-therapeutic for this patient (goal range 2-3) and is reflective of 17.5 mg TWD  Patient verifies current dosing regimen, patient able to verbally recall dose  Patient reports 1 missed dose as instructed since last INR   Patient denies s/sx clotting and/or stroke  Patient denies hematuria, epistaxis, rectal bleeding  Patient denies changes in diet, alcohol, or tobacco use  Reviewed medication list and drug allergies with patient, updated any medication additions or modifications accordingly  Patient also denies any pending medical or dental procedures scheduled at this time  Patient was instructed to continue 22.5 mg TWD and RTC 2 weeks      Patient is starting to feel better and has a better appetite.

## 2018-09-13 ENCOUNTER — HOSPITAL ENCOUNTER (OUTPATIENT)
Dept: PHARMACY | Age: 83
Setting detail: THERAPIES SERIES
Discharge: HOME | End: 2018-09-13
Payer: MEDICARE

## 2018-09-13 LAB
INR BLD: 2.2
PROTIME: 26.8 SECONDS

## 2018-09-13 PROCEDURE — 99211 OFF/OP EST MAY X REQ PHY/QHP: CPT

## 2018-09-13 PROCEDURE — 85610 PROTHROMBIN TIME: CPT

## 2018-09-19 ENCOUNTER — OFFICE VISIT (OUTPATIENT)
Dept: PRIMARY CARE CLINIC | Age: 83
End: 2018-09-19
Payer: MEDICARE

## 2018-09-19 ENCOUNTER — CARE COORDINATION (OUTPATIENT)
Dept: CARE COORDINATION | Age: 83
End: 2018-09-19

## 2018-09-19 VITALS
BODY MASS INDEX: 28.73 KG/M2 | WEIGHT: 194 LBS | TEMPERATURE: 97.3 F | RESPIRATION RATE: 18 BRPM | HEIGHT: 69 IN | DIASTOLIC BLOOD PRESSURE: 66 MMHG | OXYGEN SATURATION: 95 % | SYSTOLIC BLOOD PRESSURE: 130 MMHG | HEART RATE: 75 BPM

## 2018-09-19 DIAGNOSIS — M25.511 ACUTE PAIN OF RIGHT SHOULDER: ICD-10-CM

## 2018-09-19 DIAGNOSIS — I48.91 ATRIAL FIBRILLATION, UNSPECIFIED TYPE (HCC): ICD-10-CM

## 2018-09-19 DIAGNOSIS — Z23 NEED FOR INFLUENZA VACCINATION: ICD-10-CM

## 2018-09-19 DIAGNOSIS — R60.0 LOCALIZED EDEMA: ICD-10-CM

## 2018-09-19 DIAGNOSIS — R29.6 FREQUENT FALLS: ICD-10-CM

## 2018-09-19 DIAGNOSIS — S40.011A TRAUMATIC HEMATOMA OF RIGHT SHOULDER, INITIAL ENCOUNTER: ICD-10-CM

## 2018-09-19 DIAGNOSIS — J44.9 CHRONIC OBSTRUCTIVE PULMONARY DISEASE, UNSPECIFIED COPD TYPE (HCC): ICD-10-CM

## 2018-09-19 DIAGNOSIS — I49.9 IRREGULAR HEART RATE: ICD-10-CM

## 2018-09-19 DIAGNOSIS — I49.9 IRREGULAR HEART RATE: Primary | ICD-10-CM

## 2018-09-19 LAB
ALBUMIN SERPL-MCNC: 3.8 G/DL (ref 3.9–4.9)
ALP BLD-CCNC: 71 U/L (ref 35–104)
ALT SERPL-CCNC: 12 U/L (ref 0–41)
ANION GAP SERPL CALCULATED.3IONS-SCNC: 17 MEQ/L (ref 7–13)
ANISOCYTOSIS: ABNORMAL
AST SERPL-CCNC: 22 U/L (ref 0–40)
BANDED NEUTROPHILS RELATIVE PERCENT: 1 %
BASOPHILS ABSOLUTE: 0 K/UL (ref 0–0.2)
BASOPHILS RELATIVE PERCENT: 1 %
BILIRUB SERPL-MCNC: 0.4 MG/DL (ref 0–1.2)
BUN BLDV-MCNC: 14 MG/DL (ref 8–23)
CALCIUM SERPL-MCNC: 9.2 MG/DL (ref 8.6–10.2)
CHLORIDE BLD-SCNC: 100 MEQ/L (ref 98–107)
CO2: 30 MEQ/L (ref 22–29)
CREAT SERPL-MCNC: 0.58 MG/DL (ref 0.7–1.2)
EOSINOPHILS ABSOLUTE: 0.2 K/UL (ref 0–0.7)
EOSINOPHILS RELATIVE PERCENT: 7 %
GFR AFRICAN AMERICAN: >60
GFR NON-AFRICAN AMERICAN: >60
GLOBULIN: 2.7 G/DL (ref 2.3–3.5)
GLUCOSE BLD-MCNC: 91 MG/DL (ref 74–109)
HCT VFR BLD CALC: 27.8 % (ref 42–52)
HEMOGLOBIN: 8.7 G/DL (ref 14–18)
HYPOCHROMIA: ABNORMAL
LYMPHOCYTES ABSOLUTE: 0.7 K/UL (ref 1–4.8)
LYMPHOCYTES RELATIVE PERCENT: 29 %
MCH RBC QN AUTO: 28.8 PG (ref 27–31.3)
MCHC RBC AUTO-ENTMCNC: 31.1 % (ref 33–37)
MCV RBC AUTO: 92.6 FL (ref 80–100)
MONOCYTES ABSOLUTE: 0.4 K/UL (ref 0.2–0.8)
MONOCYTES RELATIVE PERCENT: 15.4 %
NEUTROPHILS ABSOLUTE: 1.2 K/UL (ref 1.4–6.5)
NEUTROPHILS RELATIVE PERCENT: 47 %
PDW BLD-RTO: 18.9 % (ref 11.5–14.5)
PLATELET # BLD: 178 K/UL (ref 130–400)
PLATELET SLIDE REVIEW: NORMAL
POIKILOCYTES: ABNORMAL
POTASSIUM SERPL-SCNC: 4.1 MEQ/L (ref 3.5–5.1)
RBC # BLD: 3 M/UL (ref 4.7–6.1)
SODIUM BLD-SCNC: 147 MEQ/L (ref 132–144)
TOTAL PROTEIN: 6.5 G/DL (ref 6.4–8.1)
WBC # BLD: 2.5 K/UL (ref 4.8–10.8)

## 2018-09-19 PROCEDURE — 90662 IIV NO PRSV INCREASED AG IM: CPT | Performed by: FAMILY MEDICINE

## 2018-09-19 PROCEDURE — 1101F PT FALLS ASSESS-DOCD LE1/YR: CPT | Performed by: FAMILY MEDICINE

## 2018-09-19 PROCEDURE — G8417 CALC BMI ABV UP PARAM F/U: HCPCS | Performed by: FAMILY MEDICINE

## 2018-09-19 PROCEDURE — G0008 ADMIN INFLUENZA VIRUS VAC: HCPCS | Performed by: FAMILY MEDICINE

## 2018-09-19 PROCEDURE — G8926 SPIRO NO PERF OR DOC: HCPCS | Performed by: FAMILY MEDICINE

## 2018-09-19 PROCEDURE — G8427 DOCREV CUR MEDS BY ELIG CLIN: HCPCS | Performed by: FAMILY MEDICINE

## 2018-09-19 PROCEDURE — 1123F ACP DISCUSS/DSCN MKR DOCD: CPT | Performed by: FAMILY MEDICINE

## 2018-09-19 PROCEDURE — 99214 OFFICE O/P EST MOD 30 MIN: CPT | Performed by: FAMILY MEDICINE

## 2018-09-19 PROCEDURE — 3023F SPIROM DOC REV: CPT | Performed by: FAMILY MEDICINE

## 2018-09-19 PROCEDURE — 1036F TOBACCO NON-USER: CPT | Performed by: FAMILY MEDICINE

## 2018-09-19 PROCEDURE — 93000 ELECTROCARDIOGRAM COMPLETE: CPT | Performed by: FAMILY MEDICINE

## 2018-09-19 PROCEDURE — 4040F PNEUMOC VAC/ADMIN/RCVD: CPT | Performed by: FAMILY MEDICINE

## 2018-09-19 PROCEDURE — G8598 ASA/ANTIPLAT THER USED: HCPCS | Performed by: FAMILY MEDICINE

## 2018-09-19 RX ORDER — FUROSEMIDE 20 MG/1
20 TABLET ORAL DAILY
Qty: 60 TABLET | Refills: 3 | Status: SHIPPED | OUTPATIENT
Start: 2018-09-19

## 2018-09-19 ASSESSMENT — ENCOUNTER SYMPTOMS
CHEST TIGHTNESS: 0
STRIDOR: 0
CHOKING: 0
BOWEL INCONTINENCE: 0
SWOLLEN GLANDS: 0
WHEEZING: 0
NAUSEA: 0
FACIAL SWELLING: 0
VOMITING: 0
CONSTIPATION: 0
EYE REDNESS: 0
RHINORRHEA: 0
SHORTNESS OF BREATH: 1
ORTHOPNEA: 0
APNEA: 0
EYE PAIN: 0
EYE DISCHARGE: 0
SORE THROAT: 0
HEMOPTYSIS: 0
COLOR CHANGE: 0
PHOTOPHOBIA: 0
ABDOMINAL PAIN: 0
SPUTUM PRODUCTION: 0
VISUAL CHANGE: 0
DIARRHEA: 0

## 2018-09-19 NOTE — PROGRESS NOTES
Vaccine Information Sheet, \"Influenza - Inactivated\"  given to Funmilayo Sheridan, or parent/legal guardian of  Funmilayo Sheridan and verbalized understanding. Patient responses:    Have you ever had a reaction to a flu vaccine? No  Are you able to eat eggs without adverse effects? Yes  Do you have any current illness? No  Have you ever had Guillian Asher Syndrome? No    Flu vaccine given per order. Please see immunization tab.
reviewed. Assessment:      Diagnosis Orders   1. Irregular heart rate  EKG 12 Lead    Comprehensive Metabolic Panel    CBC Auto Differential   2. H/O Atrial fibrillation, unspecified type   EKG 12 Lead   3. Chronic obstructive pulmonary disease, unspecified COPD type ([facility] [address]     4. Frequent falls  Comprehensive Metabolic Panel    CBC Auto Differential   5. Need for influenza vaccination     6. Acute pain of right shoulder     7. Traumatic hematoma of right shoulder, initial encounter     8. Localized edema  Comprehensive Metabolic Panel    CBC Auto Differential    furosemide (LASIX) 20 MG tablet       Plan:      Orders Placed This Encounter   Procedures    INFLUENZA, HIGH DOSE, 65 YRS +, IM, PF, PREFILL SYR, 0.5ML (FLUZONE HD)    Comprehensive Metabolic Panel     Standing Status:   Future     Standing Expiration Date:   9/19/2019    CBC Auto Differential     Standing Status:   Future     Standing Expiration Date:   9/19/2019    EKG 12 Lead     Order Specific Question:   Reason for Exam?     Answer:   Irregular heart rate     Orders Placed This Encounter   Medications    furosemide (LASIX) 20 MG tablet     Sig: Take 1 tablet by mouth daily     Dispense:  60 tablet     Refill:  3       Controlled Substances Monitoring:      No Follow-up on file. I, Guanakito Montoya CMA   , am scribing for and in the presence of PlananaDO. Electronically signed by :  Guanakito Diaz DO, personally performed the services described in this documentation, as scribed by Jeanette Claudio CMA   in my presence, and it is both accurate and complete.  Electronically signed by: PlananaDO    9/19/18 11:50 AM    PlananaDO

## 2018-09-20 NOTE — CARE COORDINATION
condition. Barriers: impairment:  cognitive  Plan for overcoming my barriers: CC will stay in contact and discuss health every 2 weeks  Confidence: 6/10  Anticipated Goal Completion Date: 12/28/2018              Prior to Admission medications    Medication Sig Start Date End Date Taking? Authorizing Provider   furosemide (LASIX) 20 MG tablet Take 1 tablet by mouth daily 9/19/18   Chandler Lee DO   warfarin (COUMADIN) 2.5 MG tablet Take 2 tablets on Monday and Thursday and 1 tablet on other 5 days. 8/15/18   Wes Casanova, DO   diazepam (VALIUM) 5 MG tablet Take 0.5 tablets by mouth every 8 hours as needed for Anxiety. . 8/14/18   Wes Casanova, DO   pantoprazole (PROTONIX) 40 MG tablet TAKE 1 TABLET BY MOUTH DAILY 8/8/18   Wes Casanova, DO   QUEtiapine (SEROQUEL) 25 MG tablet Take 25 mg by mouth daily    Historical Provider, MD   metFORMIN (GLUCOPHAGE) 500 MG tablet Take 250 mg by mouth daily    Historical Provider, MD   buPROPion (WELLBUTRIN XL) 150 MG extended release tablet Take 150 mg by mouth every morning    Historical Provider, MD   ipratropium (ATROVENT) 0.03 % nasal spray 2 sprays by Nasal route 2 times daily 8/8/18   POLA Matias   nystatin (MYCOSTATIN) 418493 UNIT/GM powder Apply topically 4 times daily. 6/6/18   Wes Casanova DO   simvastatin (ZOCOR) 20 MG tablet TAKE 1 TABLET BY MOUTH DAILY. 5/21/18   Chandler Lee DO   fluticasone-salmeterol (ADVAIR HFA) 970-27 MCG/ACT inhaler INHALE 2 PUFFS INTO THE LUNGS EVERY 12 HOURS RINSE MOUTH AFTER USE 1/31/18   Chandler Lee DO   PARoxetine (PAXIL) 20 MG tablet Take 1 tablet by mouth daily 1/17/18   Wes Casanova DO   ipratropium-albuterol (DUONEB) 0.5-2.5 (3) MG/3ML SOLN nebulizer solution Inhale 3 mLs into the lungs every 4 hours  Patient taking differently: Inhale 1 vial into the lungs every 4 hours as needed  12/27/17   Wes Casanova DO   finasteride (PROSCAR) 5 MG tablet TAKE 1 TABLET BY MOUTH DAILY.  11/20/17   Chandler Lee DO ascorbic acid ([employer]S VITAMIN C) 1000 MG tablet Take 1,000 mg by mouth 2 times daily    Historical Provider, MD   magnesium oxide (MAG-OX) 400 MG tablet Take 400 mg by mouth daily     Historical Provider, MD   nitroGLYCERIN (NITROSTAT) 0.4 MG SL tablet Place 0.4 mg under the tongue every 5 minutes as needed for Chest pain Dissolve 1 tablet under tongue for chest pain and repeat every 5 min up to max of 3 total doses. If no relief after 3 doses call 911    Historical Provider, MD   aspirin 81 MG EC tablet Take 81 mg by mouth daily.       Historical Provider, MD       Future Appointments  Date Time Provider Laurence Dobbins   9/27/2018 9:15 AM [facility] MEDICATION MANAGEMENT [facility] [address]   10/17/2018 9:45 AM DO CAMELIA Gonzales [facility] EMERGENCY [facility] AT [place]

## 2018-09-21 ENCOUNTER — CARE COORDINATION (OUTPATIENT)
Dept: CARE COORDINATION | Age: 83
End: 2018-09-21

## 2018-09-21 DIAGNOSIS — H93.13 TINNITUS OF BOTH EARS: Primary | ICD-10-CM

## 2018-09-21 RX ORDER — DIAZEPAM 2 MG/1
1 TABLET ORAL NIGHTLY
Qty: 45 TABLET | Refills: 0 | Status: SHIPPED | OUTPATIENT
Start: 2018-09-21 | End: 2018-12-01 | Stop reason: SDUPTHER

## 2018-09-26 DIAGNOSIS — H93.13 TINNITUS OF BOTH EARS: ICD-10-CM

## 2018-09-27 RX ORDER — DIAZEPAM 2 MG/1
2 TABLET ORAL NIGHTLY
Qty: 90 TABLET | Refills: 0 | OUTPATIENT
Start: 2018-09-27 | End: 2018-12-26

## 2018-10-01 RX ORDER — FLUTICASONE PROPIONATE AND SALMETEROL XINAFOATE 115; 21 UG/1; UG/1
AEROSOL, METERED RESPIRATORY (INHALATION)
Qty: 12 INHALER | Refills: 3 | Status: SHIPPED | OUTPATIENT
Start: 2018-10-01

## 2018-10-08 ENCOUNTER — TELEPHONE (OUTPATIENT)
Dept: PHARMACY | Age: 83
End: 2018-10-08

## 2018-10-08 DIAGNOSIS — I48.91 ATRIAL FIBRILLATION, UNSPECIFIED TYPE (HCC): ICD-10-CM

## 2018-10-08 RX ORDER — CHLORHEXIDINE GLUCONATE 0.12 MG/ML
15 RINSE ORAL 2 TIMES DAILY
Qty: 473 ML | Refills: 0 | Status: SHIPPED | OUTPATIENT
Start: 2018-10-08 | End: 2018-11-21 | Stop reason: SDUPTHER

## 2018-10-08 NOTE — TELEPHONE ENCOUNTER
Updated POC INR date to reflect patient's next scheduled appointment date on 10/10/18    Marvin Cunha PharmGRAEME   10/8/2018 7:45 AM

## 2018-10-10 ENCOUNTER — HOSPITAL ENCOUNTER (OUTPATIENT)
Dept: PHARMACY | Age: 83
Setting detail: THERAPIES SERIES
Discharge: HOME | End: 2018-10-10
Payer: MEDICARE

## 2018-10-10 DIAGNOSIS — I48.91 ATRIAL FIBRILLATION, UNSPECIFIED TYPE (HCC): ICD-10-CM

## 2018-10-10 LAB
INR BLD: 2.4
PROTIME: 29.4 SECONDS

## 2018-10-10 PROCEDURE — 99211 OFF/OP EST MAY X REQ PHY/QHP: CPT

## 2018-10-10 PROCEDURE — 85610 PROTHROMBIN TIME: CPT

## 2018-10-10 NOTE — PROGRESS NOTES
Mr. John Cox is a 81 y.o. y/o male with history of Afib who presents today for anticoagulation monitoring and adjustment.   INR 2.4 is therapeutic for this patient (goal range 2-3) and is reflective of 22.5 mg TWD  Patient verifies current dosing regimen, patient able to verbally recall dose  Patient reports 0  missed doses since last INR   Patient denies s/sx clotting and/or stroke  Patient denies hematuria, epistaxis, rectal bleeding  Patient denies changes in diet, alcohol, or tobacco use  Reviewed medication list and drug allergies with patient, updated any medication additions or modifications accordingly  Patient also denies any pending medical or dental procedures scheduled at this time  Patient was instructed to continue 22.5 mg TWD and RTC 4 weeks

## 2018-10-17 ENCOUNTER — OFFICE VISIT (OUTPATIENT)
Dept: PRIMARY CARE CLINIC | Age: 83
End: 2018-10-17
Payer: MEDICARE

## 2018-10-17 ENCOUNTER — CARE COORDINATION (OUTPATIENT)
Dept: CARE COORDINATION | Age: 83
End: 2018-10-17

## 2018-10-17 VITALS
HEART RATE: 56 BPM | BODY MASS INDEX: 28.91 KG/M2 | HEIGHT: 69 IN | TEMPERATURE: 98.1 F | OXYGEN SATURATION: 83 % | DIASTOLIC BLOOD PRESSURE: 42 MMHG | SYSTOLIC BLOOD PRESSURE: 84 MMHG | RESPIRATION RATE: 12 BRPM | WEIGHT: 195.2 LBS

## 2018-10-17 DIAGNOSIS — R60.0 LOCALIZED EDEMA: ICD-10-CM

## 2018-10-17 DIAGNOSIS — D64.9 ANEMIA, UNSPECIFIED TYPE: ICD-10-CM

## 2018-10-17 DIAGNOSIS — J44.9 CHRONIC OBSTRUCTIVE PULMONARY DISEASE, UNSPECIFIED COPD TYPE (HCC): Primary | ICD-10-CM

## 2018-10-17 DIAGNOSIS — D50.8 OTHER IRON DEFICIENCY ANEMIA: ICD-10-CM

## 2018-10-17 DIAGNOSIS — I10 ESSENTIAL HYPERTENSION: ICD-10-CM

## 2018-10-17 DIAGNOSIS — R22.43 LOCALIZED SWELLING OF BOTH LOWER LEGS: ICD-10-CM

## 2018-10-17 DIAGNOSIS — C45.9 MESOTHELIOMA (HCC): ICD-10-CM

## 2018-10-17 PROCEDURE — G8482 FLU IMMUNIZE ORDER/ADMIN: HCPCS | Performed by: FAMILY MEDICINE

## 2018-10-17 PROCEDURE — G8926 SPIRO NO PERF OR DOC: HCPCS | Performed by: FAMILY MEDICINE

## 2018-10-17 PROCEDURE — G8417 CALC BMI ABV UP PARAM F/U: HCPCS | Performed by: FAMILY MEDICINE

## 2018-10-17 PROCEDURE — 4040F PNEUMOC VAC/ADMIN/RCVD: CPT | Performed by: FAMILY MEDICINE

## 2018-10-17 PROCEDURE — 1036F TOBACCO NON-USER: CPT | Performed by: FAMILY MEDICINE

## 2018-10-17 PROCEDURE — 3023F SPIROM DOC REV: CPT | Performed by: FAMILY MEDICINE

## 2018-10-17 PROCEDURE — G8598 ASA/ANTIPLAT THER USED: HCPCS | Performed by: FAMILY MEDICINE

## 2018-10-17 PROCEDURE — 99213 OFFICE O/P EST LOW 20 MIN: CPT | Performed by: FAMILY MEDICINE

## 2018-10-17 PROCEDURE — 1123F ACP DISCUSS/DSCN MKR DOCD: CPT | Performed by: FAMILY MEDICINE

## 2018-10-17 PROCEDURE — G8427 DOCREV CUR MEDS BY ELIG CLIN: HCPCS | Performed by: FAMILY MEDICINE

## 2018-10-17 PROCEDURE — 1101F PT FALLS ASSESS-DOCD LE1/YR: CPT | Performed by: FAMILY MEDICINE

## 2018-10-17 RX ORDER — LANOLIN ALCOHOL/MO/W.PET/CERES
325 CREAM (GRAM) TOPICAL
Qty: 30 TABLET | Refills: 5 | Status: SHIPPED | OUTPATIENT
Start: 2018-10-17 | End: 2019-04-01 | Stop reason: SDUPTHER

## 2018-10-17 ASSESSMENT — ENCOUNTER SYMPTOMS
PHOTOPHOBIA: 0
CHEST TIGHTNESS: 0
WHEEZING: 0
EYE REDNESS: 0
SHORTNESS OF BREATH: 1
CONSTIPATION: 0
NAUSEA: 0
FACIAL SWELLING: 0
EYE DISCHARGE: 0
EYE PAIN: 0
DIARRHEA: 0
COLOR CHANGE: 0
APNEA: 0
ABDOMINAL PAIN: 0
STRIDOR: 0
CHOKING: 0

## 2018-10-17 NOTE — PROGRESS NOTES
Never Smoker    Smokeless tobacco: Never Used    Alcohol use No    Drug use: No    Sexual activity: Not on file     Other Topics Concern    Not on file     Social History Narrative    No narrative on file     Allergies:  Codeine    Review of Systems   Constitutional: Negative for activity change, appetite change, chills, diaphoresis, fatigue and fever. HENT: Negative for congestion, ear discharge, ear pain, facial swelling, hearing loss and mouth sores. Eyes: Negative for photophobia, pain, discharge and redness. Respiratory: Positive for shortness of breath. Negative for apnea, choking, chest tightness, wheezing and stridor. Cardiovascular: Negative for chest pain, palpitations and leg swelling. Gastrointestinal: Negative for abdominal pain, constipation, diarrhea and nausea. Endocrine: Negative for cold intolerance, heat intolerance, polydipsia and polyphagia. Genitourinary: Negative for dysuria and frequency. Musculoskeletal: Negative for gait problem, joint swelling, neck pain and neck stiffness. Skin: Negative for color change, pallor, rash and wound. Allergic/Immunologic: Negative for immunocompromised state. Neurological: Negative for tremors, syncope, facial asymmetry, speech difficulty and headaches. Psychiatric/Behavioral: Negative for confusion and hallucinations. The patient is not nervous/anxious and is not hyperactive. Objective:   BP (!) 84/42 (Site: Left Upper Arm, Position: Sitting, Cuff Size: Medium Adult)   Pulse 56   Temp 98.1 °F (36.7 °C) (Oral)   Resp 12   Ht 5' 9\" (1.753 m)   Wt 195 lb 3.2 oz (88.5 kg)   SpO2 (!) 83%   BMI 28.83 kg/m²     Physical Exam   Constitutional: He is oriented to person, place, and time. He appears well-developed and well-nourished. HENT:   Head: Normocephalic and atraumatic. Mouth/Throat: Oropharynx is clear and moist. No oropharyngeal exudate. Eyes: Pupils are equal, round, and reactive to light.  Conjunctivae and

## 2018-10-19 ASSESSMENT — ENCOUNTER SYMPTOMS: DYSPNEA ASSOCIATED WITH: MINIMAL EXERTION

## 2018-10-19 NOTE — CARE COORDINATION
Interventions and [employer] [facility]:  Completed  Fall Risk Prevention:  Completed  Home Health Services:  Completed  Physical Therapy:  Completed  Zone Management Tools: In Process         Goals Addressed             Most Recent     Conditions and Symptoms   Worsening (10/17/2018)             I will follow my Zone Management tool to seek urgent or emergent care. I will notify my provider of any symptoms that indicate a worsening of my condition. Barriers: impairment:  cognitive  Plan for overcoming my barriers: CC will stay in contact and discuss health every 2 weeks  Confidence: 6/10  Anticipated Goal Completion Date: 12/28/2018              Prior to Admission medications    Medication Sig Start Date End Date Taking? Authorizing Provider   ferrous sulfate (FE TABS) 325 (65 Fe) MG EC tablet Take 1 tablet by mouth daily (with breakfast) 10/17/18   Juliano Rendon DO   blood glucose test strips (ASCENSIA AUTODISC VI;ONE TOUCH ULTRA TEST VI) strip TEST GLUCOSE THREE TIMES DAILY. Dx E11.9 BRAND EMBRACE 10/15/18   Silvana Casanova DO   chlorhexidine (PERIDEX) 0.12 % solution TAKE 15 MLS BY MOUTH 2 TIMES DAILY FOR 14 DAYS 10/8/18 10/22/18  Silvana Casanova DO   ADVAIR -21 MCG/ACT inhaler INHALE 2 PUFFS INTO THE LUNGS EVERY 12 HOURS. RINSE MOUTH AFTER USE 10/1/18   Juliano Rendon DO   diazepam (VALIUM) 2 MG tablet Take 0.5 tablets by mouth nightly for 90 days. . 9/21/18 12/20/18  Juliano Rendon DO   furosemide (LASIX) 20 MG tablet Take 1 tablet by mouth daily 9/19/18   Juliano Rendon DO   warfarin (COUMADIN) 2.5 MG tablet Take 2 tablets on Monday and Thursday and 1 tablet on other 5 days.  8/15/18   Silvana Casanova DO   pantoprazole (PROTONIX) 40 MG tablet TAKE 1 TABLET BY MOUTH DAILY 8/8/18   Silvana Casanova DO   QUEtiapine (SEROQUEL) 25 MG tablet Take 25 mg by mouth daily    Historical Provider, MD   metFORMIN (GLUCOPHAGE) 500 MG tablet Take 250 mg by mouth daily    Historical Provider,

## 2018-10-22 RX ORDER — PAROXETINE HYDROCHLORIDE 20 MG/1
20 TABLET, FILM COATED ORAL DAILY
Qty: 30 TABLET | Refills: 3 | Status: SHIPPED | OUTPATIENT
Start: 2018-10-22 | End: 2019-02-21 | Stop reason: SDUPTHER

## 2018-11-05 ENCOUNTER — CARE COORDINATION (OUTPATIENT)
Dept: CARE COORDINATION | Age: 83
End: 2018-11-05

## 2018-11-06 NOTE — CARE COORDINATION
Goal Completion Date: 12/28/2018              Prior to Admission medications    Medication Sig Start Date End Date Taking? Authorizing Provider   PARoxetine (PAXIL) 20 MG tablet TAKE 1 TABLET BY MOUTH DAILY 10/22/18   Merry River DO   ferrous sulfate (FE TABS) 325 (65 Fe) MG EC tablet Take 1 tablet by mouth daily (with breakfast) 10/17/18   Merry River DO   blood glucose test strips (ASCENSIA AUTODISC VI;ONE TOUCH ULTRA TEST VI) strip TEST GLUCOSE THREE TIMES DAILY. Dx E11.9 BRAND EMBRACE 10/15/18   Yamini Casanova DO   ADVAIR -21 MCG/ACT inhaler INHALE 2 PUFFS INTO THE LUNGS EVERY 12 HOURS. RINSE MOUTH AFTER USE 10/1/18   Merry River DO   diazepam (VALIUM) 2 MG tablet Take 0.5 tablets by mouth nightly for 90 days. . 9/21/18 12/20/18  Merry River DO   furosemide (LASIX) 20 MG tablet Take 1 tablet by mouth daily 9/19/18   Merry River DO   warfarin (COUMADIN) 2.5 MG tablet Take 2 tablets on Monday and Thursday and 1 tablet on other 5 days. 8/15/18   Yamini Casanova DO   pantoprazole (PROTONIX) 40 MG tablet TAKE 1 TABLET BY MOUTH DAILY 8/8/18   Yamini Casanova DO   QUEtiapine (SEROQUEL) 25 MG tablet Take 25 mg by mouth daily    Historical Provider, MD   metFORMIN (GLUCOPHAGE) 500 MG tablet Take 250 mg by mouth daily    Historical Provider, MD   buPROPion (WELLBUTRIN XL) 150 MG extended release tablet Take 150 mg by mouth every morning    Historical Provider, MD   ipratropium (ATROVENT) 0.03 % nasal spray 2 sprays by Nasal route 2 times daily 8/8/18   Sheryle Ar, PA   nystatin (MYCOSTATIN) 996554 UNIT/GM powder Apply topically 4 times daily. 6/6/18   Yamini Casanova DO   simvastatin (ZOCOR) 20 MG tablet TAKE 1 TABLET BY MOUTH DAILY.  5/21/18   Merry River DO   PARoxetine (PAXIL) 20 MG tablet Take 1 tablet by mouth daily 1/17/18   Yamini Casanova DO   ipratropium-albuterol (DUONEB) 0.5-2.5 (3) MG/3ML SOLN nebulizer solution Inhale 3 mLs into the lungs every 4 hours  Patient taking

## 2018-11-07 ENCOUNTER — HOSPITAL ENCOUNTER (OUTPATIENT)
Dept: PHARMACY | Age: 83
Setting detail: THERAPIES SERIES
Discharge: HOME | End: 2018-11-07
Payer: MEDICARE

## 2018-11-07 DIAGNOSIS — I48.91 ATRIAL FIBRILLATION, UNSPECIFIED TYPE (HCC): ICD-10-CM

## 2018-11-07 PROCEDURE — 85610 PROTHROMBIN TIME: CPT

## 2018-11-07 PROCEDURE — 99211 OFF/OP EST MAY X REQ PHY/QHP: CPT

## 2018-11-07 NOTE — PROGRESS NOTES
Mr. Moose Monet is a 81 y.o. y/o male with history of Afib who presents today for anticoagulation monitoring and adjustment. INR 1.4 is subtherapeutic for this patient (goal range 2-3) and is reflective of 22.5 mg TWD  Patient verifies current dosing regimen, patient able to verbally recall dose  Patient reports 1  missed doses since last INR, last Friday.   Patient denies s/sx clotting and/or stroke  Patient denies hematuria, epistaxis, rectal bleeding  Patient denies changes in diet, alcohol, or tobacco use  Reviewed medication list and drug allergies with patient, updated any medication additions or modifications accordingly  Patient also denies any pending medical or dental procedures scheduled at this time  Patient was instructed to boost with 5 mg today, then increase 25 mg and RTC 2 weeks

## 2018-11-12 ENCOUNTER — CARE COORDINATION (OUTPATIENT)
Dept: CARE COORDINATION | Age: 83
End: 2018-11-12

## 2018-11-14 NOTE — CARE COORDINATION
Received call from stefan to question his hematology appt . He states he has lost phone number and is unsure where office is located. Information provided.  Rahel Lizarraga reports no recent decline

## 2018-11-20 ENCOUNTER — HOSPITAL ENCOUNTER (OUTPATIENT)
Dept: PHARMACY | Age: 83
Setting detail: THERAPIES SERIES
Discharge: HOME | End: 2018-11-20
Payer: MEDICARE

## 2018-11-20 DIAGNOSIS — I48.91 ATRIAL FIBRILLATION, UNSPECIFIED TYPE (HCC): ICD-10-CM

## 2018-11-20 PROCEDURE — 99211 OFF/OP EST MAY X REQ PHY/QHP: CPT

## 2018-11-20 PROCEDURE — 85610 PROTHROMBIN TIME: CPT

## 2018-11-21 ENCOUNTER — OFFICE VISIT (OUTPATIENT)
Dept: PRIMARY CARE CLINIC | Age: 83
End: 2018-11-21
Payer: MEDICARE

## 2018-11-21 ENCOUNTER — CARE COORDINATION (OUTPATIENT)
Dept: CARE COORDINATION | Age: 83
End: 2018-11-21

## 2018-11-21 VITALS
SYSTOLIC BLOOD PRESSURE: 118 MMHG | DIASTOLIC BLOOD PRESSURE: 72 MMHG | TEMPERATURE: 97.9 F | HEART RATE: 63 BPM | OXYGEN SATURATION: 97 % | WEIGHT: 193.6 LBS | RESPIRATION RATE: 16 BRPM | HEIGHT: 69 IN | BODY MASS INDEX: 28.68 KG/M2

## 2018-11-21 DIAGNOSIS — M79.89 SWELLING OF BOTH LOWER EXTREMITIES: ICD-10-CM

## 2018-11-21 DIAGNOSIS — J44.9 CHRONIC OBSTRUCTIVE PULMONARY DISEASE, UNSPECIFIED COPD TYPE (HCC): ICD-10-CM

## 2018-11-21 DIAGNOSIS — K08.89 PAIN, DENTAL: ICD-10-CM

## 2018-11-21 DIAGNOSIS — K05.10 GINGIVITIS: Primary | ICD-10-CM

## 2018-11-21 PROCEDURE — G8417 CALC BMI ABV UP PARAM F/U: HCPCS | Performed by: FAMILY MEDICINE

## 2018-11-21 PROCEDURE — G8482 FLU IMMUNIZE ORDER/ADMIN: HCPCS | Performed by: FAMILY MEDICINE

## 2018-11-21 PROCEDURE — 99214 OFFICE O/P EST MOD 30 MIN: CPT | Performed by: FAMILY MEDICINE

## 2018-11-21 PROCEDURE — 4040F PNEUMOC VAC/ADMIN/RCVD: CPT | Performed by: FAMILY MEDICINE

## 2018-11-21 PROCEDURE — G8427 DOCREV CUR MEDS BY ELIG CLIN: HCPCS | Performed by: FAMILY MEDICINE

## 2018-11-21 PROCEDURE — 1036F TOBACCO NON-USER: CPT | Performed by: FAMILY MEDICINE

## 2018-11-21 PROCEDURE — 1123F ACP DISCUSS/DSCN MKR DOCD: CPT | Performed by: FAMILY MEDICINE

## 2018-11-21 PROCEDURE — 3023F SPIROM DOC REV: CPT | Performed by: FAMILY MEDICINE

## 2018-11-21 PROCEDURE — G8926 SPIRO NO PERF OR DOC: HCPCS | Performed by: FAMILY MEDICINE

## 2018-11-21 PROCEDURE — 1101F PT FALLS ASSESS-DOCD LE1/YR: CPT | Performed by: FAMILY MEDICINE

## 2018-11-21 PROCEDURE — G8598 ASA/ANTIPLAT THER USED: HCPCS | Performed by: FAMILY MEDICINE

## 2018-11-21 RX ORDER — AMOXICILLIN 875 MG/1
875 TABLET, COATED ORAL 2 TIMES DAILY
Qty: 42 TABLET | Refills: 0 | Status: SHIPPED | OUTPATIENT
Start: 2018-11-21 | End: 2018-12-12

## 2018-11-21 RX ORDER — FLUTICASONE FUROATE AND VILANTEROL 100; 25 UG/1; UG/1
1 POWDER RESPIRATORY (INHALATION) DAILY
Qty: 2 EACH | Refills: 3 | Status: SHIPPED | OUTPATIENT
Start: 2018-11-21

## 2018-11-21 RX ORDER — CHLORHEXIDINE GLUCONATE 0.12 MG/ML
15 RINSE ORAL 2 TIMES DAILY
Qty: 473 ML | Refills: 3 | Status: SHIPPED | OUTPATIENT
Start: 2018-11-21 | End: 2018-12-05

## 2018-11-21 RX ORDER — IPRATROPIUM BROMIDE AND ALBUTEROL SULFATE 2.5; .5 MG/3ML; MG/3ML
1 SOLUTION RESPIRATORY (INHALATION) EVERY 4 HOURS
Qty: 360 ML | Refills: 3 | Status: SHIPPED | OUTPATIENT
Start: 2018-11-21

## 2018-11-21 ASSESSMENT — ENCOUNTER SYMPTOMS
APNEA: 0
PHOTOPHOBIA: 0
FACIAL SWELLING: 0
SPUTUM PRODUCTION: 0
ABDOMINAL PAIN: 0
TROUBLE SWALLOWING: 0
EYE PAIN: 0
HEMOPTYSIS: 0
CHOKING: 0
CONSTIPATION: 0
COUGH: 0
NAUSEA: 0
SHORTNESS OF BREATH: 0
STRIDOR: 0
DIFFICULTY BREATHING: 0
SORE THROAT: 0
COLOR CHANGE: 0
RHINORRHEA: 0
WHEEZING: 0
SINUS PRESSURE: 0
CHEST TIGHTNESS: 0
HOARSE VOICE: 0
EYE DISCHARGE: 0
FREQUENT THROAT CLEARING: 0
EYE REDNESS: 0
HEARTBURN: 0
DIARRHEA: 0

## 2018-11-21 ASSESSMENT — COPD QUESTIONNAIRES: COPD: 1

## 2018-11-21 NOTE — PROGRESS NOTES
Surgical History:   Procedure Laterality Date    ANGIOPLASTY  1996    BACK SURGERY  12/2002   Chrissy Mann CARDIAC VALVE SURGERY  3/24/15    Harmeet Grimes CATARACT REMOVAL  2010    X2    COLONOSCOPY  09/07/2016    DR. WALDROP    CORONARY ANGIOPLASTY WITH STENT PLACEMENT  11/2007    X2    CORONARY ARTERY BYPASS GRAFT  3/24/15    Dr Harmeet Grimes POLYPECTOMY  09/2016    7 colon polyps removed    TOTAL ANKLE ARTHROPLASTY  5/25/11    R    TOTAL KNEE ARTHROPLASTY  1998    LEFT     Family History   Problem Relation Age of Onset    High Blood Pressure Father      Social History     Social History    Marital status:      Spouse name: N/A    Number of children: N/A    Years of education: N/A     Occupational History    Not on file. Social History Main Topics    Smoking status: Never Smoker    Smokeless tobacco: Never Used    Alcohol use No    Drug use: No    Sexual activity: Not on file     Other Topics Concern    Not on file     Social History Narrative    No narrative on file     Allergies:  Codeine    Review of Systems   Constitutional: Negative for activity change, appetite change, chills, diaphoresis, fever, malaise/fatigue and weight loss. HENT: Positive for dental problem. Negative for congestion, ear discharge, ear pain, facial swelling, hearing loss, hoarse voice, mouth sores, postnasal drip, rhinorrhea, sinus pressure, sneezing, sore throat and trouble swallowing. Eyes: Negative for photophobia, pain, discharge and redness. Respiratory: Negative for apnea, cough, hemoptysis, sputum production, choking, chest tightness, shortness of breath, wheezing and stridor. Cardiovascular: Negative for chest pain, dyspnea on exertion, palpitations, leg swelling and PND. Gastrointestinal: Negative for abdominal pain, constipation, diarrhea, heartburn and nausea. Endocrine: Negative for cold intolerance, heat intolerance, polydipsia and polyphagia. Genitourinary: Negative for dysuria and frequency.

## 2018-11-24 NOTE — CARE COORDINATION
Ambulatory Care Coordination Note  11/21/2018  CM Risk Score: 14  Mikie Mortality Risk Score: 58    ACC: Efrain Cleaning RN    Summary Note: met with laurie at dr visit. He reports no decline in resp status . He is co pain in rt jaw since tooth extraction. Will be started on antibiotic. He has not had any fall or injury. Daughter is assisting with medication preparation  Diabetes Assessment    Medic Alert ID:  No  Meal Planning:  Avoidance of concentrated sweets   How often do you test your blood sugar?:  No Testing   Do you have barriers with adherence to non-pharmacologic self-management interventions? (Nutrition/Exercise/Self-Monitoring):  No   Have you ever had to go to the ED for symptoms of low blood sugar?:  No       No patient-reported symptoms       and   COPD Assessment    Does the patient understand envrionmental exposure?:  Yes  Is the patient able to verbalize Rescue vs. Long Acting medications?:  No  Does the patient have a nebulizer?:  Yes  Does the patient use a space with inhaled medications?:  No     No patient-reported symptoms         Symptoms:      Have you had a recent diagnosis of pneumonia either by PCP or at a hospital?:  No             Care Coordination Interventions    Program Enrollment:  Rising Risk  Referral from Primary Care Provider:  No  Suggested Interventions and [address] [place] [address] [place]:  Completed  Fall Risk Prevention:  Completed  Home Health Services:  Completed  Physical Therapy:  Completed  Zone Management Tools: In Process         Goals Addressed             Most Recent     Conditions and Symptoms   Worsening (11/21/2018)             I will follow my Zone Management tool to seek urgent or emergent care. I will notify my provider of any symptoms that indicate a worsening of my condition.     Barriers: impairment:  cognitive  Plan for overcoming my barriers: CC will stay in contact and discuss health every 2 weeks  Confidence: 6/10  Anticipated Goal Completion 0.03 % nasal spray 2 sprays by Nasal route 2 times daily 8/8/18   POLA Lynch   nystatin (MYCOSTATIN) 673662 UNIT/GM powder Apply topically 4 times daily. 6/6/18   Osmel Casanova DO   simvastatin (ZOCOR) 20 MG tablet TAKE 1 TABLET BY MOUTH DAILY. 5/21/18   Teddy Naylor DO   PARoxetine (PAXIL) 20 MG tablet Take 1 tablet by mouth daily 1/17/18   Osmel Casanova DO   finasteride (PROSCAR) 5 MG tablet TAKE 1 TABLET BY MOUTH DAILY. 11/20/17   Teddy Naylor DO   ascorbic acid ([employer] VITAMIN C) 1000 MG tablet Take 1,000 mg by mouth 2 times daily    Historical Provider, MD   magnesium oxide (MAG-OX) 400 MG tablet Take 400 mg by mouth daily     Historical Provider, MD   nitroGLYCERIN (NITROSTAT) 0.4 MG SL tablet Place 0.4 mg under the tongue every 5 minutes as needed for Chest pain Dissolve 1 tablet under tongue for chest pain and repeat every 5 min up to max of 3 total doses. If no relief after 3 doses call 911    Historical Provider, MD   aspirin 81 MG EC tablet Take 81 mg by mouth daily.       Historical Provider, MD       Future Appointments  Date Time Provider Laurence Dobbins   12/4/2018 9:15 AM [address]   12/26/2018 1:00 PM [id number] Sukhdev, DO [facility]

## 2018-11-25 ENCOUNTER — CARE COORDINATION (OUTPATIENT)
Dept: CARE COORDINATION | Age: 83
End: 2018-11-25

## 2018-11-29 RX ORDER — SIMVASTATIN 20 MG
TABLET ORAL
Qty: 30 TABLET | Refills: 5 | Status: SHIPPED | OUTPATIENT
Start: 2018-11-29

## 2018-11-29 RX ORDER — FINASTERIDE 5 MG/1
TABLET, FILM COATED ORAL
Qty: 30 TABLET | Refills: 10 | Status: SHIPPED | OUTPATIENT
Start: 2018-11-29

## 2018-12-01 DIAGNOSIS — H93.13 TINNITUS OF BOTH EARS: ICD-10-CM

## 2018-12-04 ENCOUNTER — HOSPITAL ENCOUNTER (OUTPATIENT)
Dept: PHARMACY | Age: 83
Setting detail: THERAPIES SERIES
Discharge: HOME | End: 2018-12-04
Payer: MEDICARE

## 2018-12-04 DIAGNOSIS — I48.91 ATRIAL FIBRILLATION, UNSPECIFIED TYPE (HCC): ICD-10-CM

## 2018-12-04 LAB — INTERNATIONAL NORMALIZATION RATIO, POC: 1.9

## 2018-12-04 PROCEDURE — 85610 PROTHROMBIN TIME: CPT

## 2018-12-04 PROCEDURE — 99211 OFF/OP EST MAY X REQ PHY/QHP: CPT

## 2018-12-04 RX ORDER — DIAZEPAM 2 MG/1
TABLET ORAL
Qty: 45 TABLET | Refills: 0 | Status: SHIPPED | OUTPATIENT
Start: 2018-12-04 | End: 2018-12-26 | Stop reason: DRUGHIGH

## 2018-12-04 NOTE — PROGRESS NOTES
Mr. Rafaela Mathias is a 81 y.o. y/o male with history of Afib who presents today for anticoagulation monitoring and adjustment.   INR 1.9 is sub therapeutic for this patient (goal range 2-3) and is reflective of 25 mg TWD  Patient verifies current dosing regimen, patient able to verbally recall dose  Patient reports 0  missed doses since last INR   Patient denies s/sx clotting and/or stroke  Patient denies hematuria, epistaxis, rectal bleeding  Patient denies changes in diet, alcohol, or tobacco use  Reviewed medication list and drug allergies with patient, updated any medication additions or modifications accordingly  Patient also denies any pending medical or dental procedures scheduled at this time  Patient was instructed to increase to 26.25 mg TWD (5%) and RTC 2 weeks

## 2018-12-21 ENCOUNTER — HOSPITAL ENCOUNTER (OUTPATIENT)
Dept: PHARMACY | Age: 83
Setting detail: THERAPIES SERIES
Discharge: HOME | End: 2018-12-21
Payer: MEDICARE

## 2018-12-21 DIAGNOSIS — I48.91 ATRIAL FIBRILLATION, UNSPECIFIED TYPE (HCC): ICD-10-CM

## 2018-12-21 LAB — INTERNATIONAL NORMALIZATION RATIO, POC: 2.1

## 2018-12-21 PROCEDURE — 99211 OFF/OP EST MAY X REQ PHY/QHP: CPT

## 2018-12-21 PROCEDURE — 85610 PROTHROMBIN TIME: CPT

## 2018-12-26 DIAGNOSIS — D50.8 IRON DEFICIENCY ANEMIA SECONDARY TO INADEQUATE DIETARY IRON INTAKE: ICD-10-CM

## 2018-12-26 DIAGNOSIS — D64.9 ANEMIA, UNSPECIFIED TYPE: ICD-10-CM

## 2018-12-26 LAB
ALBUMIN SERPL-MCNC: 3.6 G/DL (ref 3.9–4.9)
ALP BLD-CCNC: 85 U/L (ref 35–104)
ALT SERPL-CCNC: 16 U/L (ref 0–41)
ANION GAP SERPL CALCULATED.3IONS-SCNC: 12 MEQ/L (ref 7–13)
AST SERPL-CCNC: 22 U/L (ref 0–40)
BILIRUB SERPL-MCNC: 0.4 MG/DL (ref 0–1.2)
BUN BLDV-MCNC: 18 MG/DL (ref 8–23)
CALCIUM SERPL-MCNC: 8.6 MG/DL (ref 8.6–10.2)
CHLORIDE BLD-SCNC: 100 MEQ/L (ref 98–107)
CO2: 29 MEQ/L (ref 22–29)
CREAT SERPL-MCNC: 0.68 MG/DL (ref 0.7–1.2)
FERRITIN: 23.3 NG/ML (ref 30–400)
FOLATE: >20 NG/ML (ref 7.3–26.1)
GFR AFRICAN AMERICAN: >60
GFR NON-AFRICAN AMERICAN: >60
GLOBULIN: 2.6 G/DL (ref 2.3–3.5)
GLUCOSE BLD-MCNC: 123 MG/DL (ref 74–109)
HCT VFR BLD CALC: 31 % (ref 42–52)
IRON SATURATION: 14 % (ref 11–46)
IRON: 44 UG/DL (ref 59–158)
POTASSIUM SERPL-SCNC: 4 MEQ/L (ref 3.5–5.1)
RETICULOCYTE ABSOLUTE COUNT: 0.02 M/CUMM (ref 0.02–0.11)
RETICULOCYTE COUNT PCT: 0.6 % (ref 0.6–2.2)
SODIUM BLD-SCNC: 141 MEQ/L (ref 132–144)
TOTAL IRON BINDING CAPACITY: 314 UG/DL (ref 178–450)
TOTAL PROTEIN: 6.2 G/DL (ref 6.4–8.1)
VITAMIN B-12: 489 PG/ML (ref 232–1245)

## 2018-12-27 ENCOUNTER — CARE COORDINATION (OUTPATIENT)
Dept: CARE COORDINATION | Age: 83
End: 2018-12-27

## 2018-12-27 PROBLEM — D50.8 IRON DEFICIENCY ANEMIA SECONDARY TO INADEQUATE DIETARY IRON INTAKE: Status: ACTIVE | Noted: 2018-12-27

## 2018-12-28 LAB
IGA: 142 MG/DL (ref 68–408)
IGG: 997 MG/DL (ref 768–1632)
IGM: 39 MG/DL (ref 35–263)

## 2018-12-29 LAB
ALBUMIN SERPL-MCNC: 3.54 G/DL (ref 3.75–5.01)
ALPHA-1-GLOBULIN: 0.33 G/DL (ref 0.19–0.46)
ALPHA-2-GLOBULIN: 0.66 G/DL (ref 0.48–1.05)
BETA GLOBULIN: 0.68 G/DL (ref 0.48–1.1)
GAMMA GLOBULIN: 0.89 G/DL (ref 0.62–1.51)
PROTEIN ELECTROPHORESIS, SERUM: ABNORMAL
SPE/IFE INTERPRETATION: ABNORMAL
TOTAL PROTEIN: 6.1 G/DL (ref 6–8.3)

## 2019-01-07 RX ORDER — NYSTATIN 100000 [USP'U]/G
POWDER TOPICAL
Qty: 60 G | Refills: 3 | Status: SHIPPED | OUTPATIENT
Start: 2019-01-07 | End: 2019-01-25 | Stop reason: SDUPTHER

## 2019-01-11 ENCOUNTER — HOSPITAL ENCOUNTER (OUTPATIENT)
Dept: PHARMACY | Age: 84
Setting detail: THERAPIES SERIES
Discharge: HOME | End: 2019-01-11
Payer: MEDICARE

## 2019-01-11 DIAGNOSIS — I48.91 ATRIAL FIBRILLATION, UNSPECIFIED TYPE (HCC): ICD-10-CM

## 2019-01-11 LAB — INTERNATIONAL NORMALIZATION RATIO, POC: 2.5

## 2019-01-11 PROCEDURE — 99211 OFF/OP EST MAY X REQ PHY/QHP: CPT

## 2019-01-11 PROCEDURE — 85610 PROTHROMBIN TIME: CPT

## 2019-01-17 RX ORDER — BLOOD-GLUCOSE METER
EACH MISCELLANEOUS
Qty: 100 STRIP | Refills: 3 | Status: SHIPPED | OUTPATIENT
Start: 2019-01-17

## 2019-01-25 PROBLEM — D64.9 ANEMIA: Status: ACTIVE | Noted: 2019-01-25

## 2019-02-01 ENCOUNTER — HOSPITAL ENCOUNTER (OUTPATIENT)
Dept: PHARMACY | Age: 84
Setting detail: THERAPIES SERIES
Discharge: HOME | End: 2019-02-01
Payer: MEDICARE

## 2019-02-01 DIAGNOSIS — I48.91 ATRIAL FIBRILLATION, UNSPECIFIED TYPE (HCC): ICD-10-CM

## 2019-02-01 PROCEDURE — 85610 PROTHROMBIN TIME: CPT | Performed by: PHARMACIST

## 2019-02-01 PROCEDURE — 99211 OFF/OP EST MAY X REQ PHY/QHP: CPT | Performed by: PHARMACIST

## 2019-02-05 ENCOUNTER — CARE COORDINATION (OUTPATIENT)
Dept: CARE COORDINATION | Age: 84
End: 2019-02-05

## 2019-02-05 ENCOUNTER — OFFICE VISIT (OUTPATIENT)
Dept: PRIMARY CARE CLINIC | Age: 84
End: 2019-02-05
Payer: MEDICARE

## 2019-02-05 ENCOUNTER — HOSPITAL ENCOUNTER (OUTPATIENT)
Dept: CT IMAGING | Age: 84
Discharge: HOME | End: 2019-02-07
Payer: MEDICARE

## 2019-02-05 VITALS
HEIGHT: 66 IN | OXYGEN SATURATION: 83 % | HEART RATE: 83 BPM | DIASTOLIC BLOOD PRESSURE: 72 MMHG | WEIGHT: 197 LBS | SYSTOLIC BLOOD PRESSURE: 118 MMHG | TEMPERATURE: 97.6 F | BODY MASS INDEX: 31.66 KG/M2

## 2019-02-05 DIAGNOSIS — E11.8 TYPE 2 DIABETES MELLITUS WITH COMPLICATION, WITHOUT LONG-TERM CURRENT USE OF INSULIN (HCC): ICD-10-CM

## 2019-02-05 DIAGNOSIS — K11.7 DROOLING: ICD-10-CM
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
## 2019-02-05 DIAGNOSIS — R44.0 AUDITORY HALLUCINATIONS: ICD-10-CM

## 2019-02-05 DIAGNOSIS — F33.8 SEASONAL AFFECTIVE DISORDER (HCC): ICD-10-CM

## 2019-02-05 DIAGNOSIS — S09.8XXA BLUNT HEAD TRAUMA, INITIAL ENCOUNTER: ICD-10-CM

## 2019-02-05 DIAGNOSIS — J44.9 CHRONIC OBSTRUCTIVE PULMONARY DISEASE, UNSPECIFIED COPD TYPE (HCC): ICD-10-CM

## 2019-02-05 DIAGNOSIS — I48.91 ATRIAL FIBRILLATION, UNSPECIFIED TYPE (HCC): ICD-10-CM
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
## 2019-02-05 PROCEDURE — 99214 OFFICE O/P EST MOD 30 MIN: CPT | Performed by: FAMILY MEDICINE

## 2019-02-05 PROCEDURE — 70450 CT HEAD/BRAIN W/O DYE: CPT

## 2019-02-05 PROCEDURE — G8926 SPIRO NO PERF OR DOC: HCPCS | Performed by: FAMILY MEDICINE

## 2019-02-05 PROCEDURE — 1101F PT FALLS ASSESS-DOCD LE1/YR: CPT | Performed by: FAMILY MEDICINE

## 2019-02-05 PROCEDURE — 1123F ACP DISCUSS/DSCN MKR DOCD: CPT | Performed by: FAMILY MEDICINE

## 2019-02-05 PROCEDURE — G8417 CALC BMI ABV UP PARAM F/U: HCPCS | Performed by: FAMILY MEDICINE

## 2019-02-05 PROCEDURE — 1036F TOBACCO NON-USER: CPT | Performed by: FAMILY MEDICINE

## 2019-02-05 PROCEDURE — G8427 DOCREV CUR MEDS BY ELIG CLIN: HCPCS | Performed by: FAMILY MEDICINE

## 2019-02-05 PROCEDURE — G8482 FLU IMMUNIZE ORDER/ADMIN: HCPCS | Performed by: FAMILY MEDICINE

## 2019-02-05 PROCEDURE — 4040F PNEUMOC VAC/ADMIN/RCVD: CPT | Performed by: FAMILY MEDICINE

## 2019-02-05 PROCEDURE — 3023F SPIROM DOC REV: CPT | Performed by: FAMILY MEDICINE

## 2019-02-05 PROCEDURE — G8598 ASA/ANTIPLAT THER USED: HCPCS | Performed by: FAMILY MEDICINE

## 2019-02-05 RX ORDER — DIAZEPAM 5 MG/1
5 TABLET ORAL NIGHTLY
Qty: 30 TABLET | Refills: 2 | Status: SHIPPED | OUTPATIENT
Start: 2019-02-05 | End: 2019-05-06

## 2019-02-05 ASSESSMENT — ENCOUNTER SYMPTOMS
RHINORRHEA: 0
ABDOMINAL PAIN: 0
WHEEZING: 0
SHORTNESS OF BREATH: 0
TROUBLE SWALLOWING: 0
STRIDOR: 0
APNEA: 0
PHOTOPHOBIA: 0
CHOKING: 0
HOARSE VOICE: 0
DIARRHEA: 0
CONSTIPATION: 0
EYE DISCHARGE: 0
HEMOPTYSIS: 0
HEARTBURN: 0
VISUAL CHANGE: 0
EYE PAIN: 0
CHEST TIGHTNESS: 0
NAUSEA: 0
DIFFICULTY BREATHING: 0
SPUTUM PRODUCTION: 0
FREQUENT THROAT CLEARING: 0
COLOR CHANGE: 0
VOMITING: 0
EYE REDNESS: 0
FACIAL SWELLING: 0
SORE THROAT: 0
COUGH: 0
BOWEL INCONTINENCE: 0

## 2019-02-05 ASSESSMENT — COPD QUESTIONNAIRES: COPD: 1

## 2019-02-22 ENCOUNTER — HOSPITAL ENCOUNTER (OUTPATIENT)
Dept: PHARMACY | Age: 84
Setting detail: THERAPIES SERIES
Discharge: HOME | End: 2019-02-22
Payer: MEDICARE

## 2019-02-22 DIAGNOSIS — I48.91 ATRIAL FIBRILLATION, UNSPECIFIED TYPE (HCC): ICD-10-CM

## 2019-02-22 LAB — INTERNATIONAL NORMALIZATION RATIO, POC: 1.8

## 2019-02-22 PROCEDURE — 99211 OFF/OP EST MAY X REQ PHY/QHP: CPT | Performed by: PHARMACIST

## 2019-02-22 PROCEDURE — 85610 PROTHROMBIN TIME: CPT | Performed by: PHARMACIST

## 2019-02-25 RX ORDER — PAROXETINE HYDROCHLORIDE 20 MG/1
20 TABLET, FILM COATED ORAL DAILY
Qty: 30 TABLET | Refills: 3 | Status: SHIPPED | OUTPATIENT
Start: 2019-02-25

## 2019-03-11 ENCOUNTER — TELEPHONE (OUTPATIENT)
Dept: PRIMARY CARE CLINIC | Age: 84
End: 2019-03-11

## 2019-03-15 ENCOUNTER — HOSPITAL ENCOUNTER (OUTPATIENT)
Dept: PHARMACY | Age: 84
Setting detail: THERAPIES SERIES
Discharge: HOME | End: 2019-03-15
Payer: MEDICARE

## 2019-03-15 DIAGNOSIS — I48.91 ATRIAL FIBRILLATION, UNSPECIFIED TYPE (HCC): ICD-10-CM

## 2019-03-15 LAB — INTERNATIONAL NORMALIZATION RATIO, POC: 2.1

## 2019-03-15 PROCEDURE — 85610 PROTHROMBIN TIME: CPT

## 2019-03-15 PROCEDURE — 99211 OFF/OP EST MAY X REQ PHY/QHP: CPT

## 2019-03-21 ENCOUNTER — CARE COORDINATION (OUTPATIENT)
Dept: CARE COORDINATION | Age: 84
End: 2019-03-21

## 2019-03-23 ENCOUNTER — TELEPHONE (OUTPATIENT)
Dept: PRIMARY CARE CLINIC | Age: 84
End: 2019-03-23

## 2019-04-11 ENCOUNTER — TELEPHONE (OUTPATIENT)
Dept: PHARMACY | Age: 84
End: 2019-04-11

## 2019-04-11 DIAGNOSIS — I48.91 ATRIAL FIBRILLATION, UNSPECIFIED TYPE (HCC): ICD-10-CM

## 2019-04-11 NOTE — TELEPHONE ENCOUNTER
Updated POC INR date to reflect patient's next scheduled appointment date on 4/12/19    Jorge Luis Coronado PharmD   4/11/2019 8:02 AM

## 2019-04-12 ENCOUNTER — HOSPITAL ENCOUNTER (OUTPATIENT)
Dept: PHARMACY | Age: 84
Setting detail: THERAPIES SERIES
Discharge: HOME | End: 2019-04-12
Payer: MEDICARE

## 2019-04-12 DIAGNOSIS — I48.91 ATRIAL FIBRILLATION, UNSPECIFIED TYPE (HCC): ICD-10-CM

## 2019-04-12 LAB — INTERNATIONAL NORMALIZATION RATIO, POC: 2.4

## 2019-04-12 PROCEDURE — 99211 OFF/OP EST MAY X REQ PHY/QHP: CPT | Performed by: PHARMACIST

## 2019-04-12 PROCEDURE — 85610 PROTHROMBIN TIME: CPT | Performed by: PHARMACIST

## 2019-04-12 NOTE — PROGRESS NOTES
Mr. Shana Iverson is a 81 y.o. y/o male with history of Afib who presents today for anticoagulation monitoring and adjustment.   INR 2.4 is therapeutic for this patient (goal range 2-3) and is reflective of 26.25 mg average TWD (alternates 2.5/5mg doses)  Patient verifies current dosing regimen, patient able to verbally recall dose  Patient reports no  missed doses since last INR   Patient denies s/sx clotting and/or stroke  Patient denies hematuria, epistaxis, rectal bleeding  Patient denies changes in diet, alcohol, or tobacco use  Reviewed medication list and drug allergies with patient, updated any medication additions or modifications accordingly  Patient also denies any pending medical or dental procedures scheduled at this time  Patient was instructed to continue 26.25mg avg TWD and RTC 4 weeks

## 2019-05-07 DIAGNOSIS — D50.8 OTHER IRON DEFICIENCY ANEMIA: ICD-10-CM

## 2019-05-08 RX ORDER — FERROUS SULFATE 325(65) MG
TABLET ORAL
Qty: 30 TABLET | Refills: 0 | OUTPATIENT
Start: 2019-05-08

## 2019-05-10 ENCOUNTER — HOSPITAL ENCOUNTER (OUTPATIENT)
Dept: PHARMACY | Age: 84
Setting detail: THERAPIES SERIES
Discharge: HOME | End: 2019-05-10
Payer: MEDICARE

## 2019-05-10 DIAGNOSIS — I48.91 ATRIAL FIBRILLATION, UNSPECIFIED TYPE (HCC): ICD-10-CM

## 2019-05-10 LAB — INTERNATIONAL NORMALIZATION RATIO, POC: 2.6

## 2019-05-10 PROCEDURE — 99211 OFF/OP EST MAY X REQ PHY/QHP: CPT | Performed by: PHARMACIST

## 2019-05-10 PROCEDURE — 85610 PROTHROMBIN TIME: CPT | Performed by: PHARMACIST

## 2019-05-10 NOTE — PROGRESS NOTES
Mr. Diamond Mann is a 81 y.o. y/o male with history of Afib who presents today for anticoagulation monitoring and adjustment.   INR 2.6 is therapeutic for this patient (goal range 2-3) and is reflective of 26.25 mg avg TWD  Patient verifies current dosing regimen, patient able to verbally recall dose  Patient reports no  missed doses since last INR   Patient denies s/sx clotting and/or stroke  Patient denies hematuria, epistaxis, rectal bleeding  Patient denies changes in diet, alcohol, or tobacco use  Reviewed medication list and drug allergies with patient, updated any medication additions or modifications accordingly  Patient also denies any pending medical or dental procedures scheduled at this time  Patient was instructed to continue 26.25mg avg TWD and RTC 4 weeks

## 2019-06-07 ENCOUNTER — HOSPITAL ENCOUNTER (OUTPATIENT)
Dept: PHARMACY | Age: 84
Setting detail: THERAPIES SERIES
Discharge: HOME | End: 2019-06-07
Payer: MEDICARE

## 2019-06-07 DIAGNOSIS — I48.91 ATRIAL FIBRILLATION, UNSPECIFIED TYPE (HCC): ICD-10-CM

## 2019-06-07 LAB — INTERNATIONAL NORMALIZATION RATIO, POC: 1.9

## 2019-06-07 PROCEDURE — 99211 OFF/OP EST MAY X REQ PHY/QHP: CPT | Performed by: PHARMACIST

## 2019-06-07 PROCEDURE — 85610 PROTHROMBIN TIME: CPT | Performed by: PHARMACIST

## 2019-06-07 NOTE — PROGRESS NOTES
Mr. Tejal Vizcarra is a 81 y.o. y/o male with history of Afib who presents today for anticoagulation monitoring and adjustment.   INR 1.9 is slightly sub-therapeutic for this patient (goal range 2-3) and is reflective of 25 mg TWD  Patient verifies current dosing regimen, patient able to verbally recall dose  Patient reports 0  missed doses since last INR   Patient denies s/sx clotting and/or stroke  Patient denies hematuria, epistaxis, rectal bleeding  Patient denies changes in diet, alcohol, or tobacco use  Reviewed medication list and drug allergies with patient, updated any medication additions or modifications accordingly  Patient also denies any pending medical or dental procedures scheduled at this time  Patient was instructed to boost tomorrow with extra tablet then continue 26.25 and RTC 4 weeks

## 2019-07-05 ENCOUNTER — HOSPITAL ENCOUNTER (OUTPATIENT)
Dept: PHARMACY | Age: 84
Setting detail: THERAPIES SERIES
Discharge: HOME | End: 2019-07-05
Payer: MEDICARE

## 2019-07-05 DIAGNOSIS — I48.91 ATRIAL FIBRILLATION, UNSPECIFIED TYPE (HCC): ICD-10-CM

## 2019-07-05 PROCEDURE — 85610 PROTHROMBIN TIME: CPT

## 2019-07-05 PROCEDURE — 99211 OFF/OP EST MAY X REQ PHY/QHP: CPT

## 2019-07-26 ENCOUNTER — HOSPITAL ENCOUNTER (OUTPATIENT)
Dept: PHARMACY | Age: 84
Setting detail: THERAPIES SERIES
Discharge: HOME | End: 2019-07-26
Payer: MEDICARE

## 2019-07-26 DIAGNOSIS — I48.91 ATRIAL FIBRILLATION, UNSPECIFIED TYPE (HCC): ICD-10-CM

## 2019-07-26 LAB — INTERNATIONAL NORMALIZATION RATIO, POC: 2.3

## 2019-07-26 PROCEDURE — 99211 OFF/OP EST MAY X REQ PHY/QHP: CPT

## 2019-07-26 PROCEDURE — 85610 PROTHROMBIN TIME: CPT

## 2019-08-23 ENCOUNTER — TELEPHONE (OUTPATIENT)
Dept: PHARMACY | Age: 84
End: 2019-08-23

## 2019-08-23 ENCOUNTER — HOSPITAL ENCOUNTER (OUTPATIENT)
Dept: PHARMACY | Age: 84
Setting detail: THERAPIES SERIES
Discharge: HOME | End: 2019-08-23
Payer: MEDICARE

## 2019-08-23 DIAGNOSIS — I48.91 ATRIAL FIBRILLATION, UNSPECIFIED TYPE (HCC): ICD-10-CM

## 2019-08-23 LAB — INTERNATIONAL NORMALIZATION RATIO, POC: 1.6

## 2019-08-23 PROCEDURE — 99211 OFF/OP EST MAY X REQ PHY/QHP: CPT | Performed by: PHARMACIST

## 2019-08-23 PROCEDURE — 85610 PROTHROMBIN TIME: CPT | Performed by: PHARMACIST

## 2019-08-23 NOTE — PROGRESS NOTES
Mr. Tim Kenney is a 81 y.o. y/o male with history of Afib who presents today for anticoagulation monitoring and adjustment. INR 1.6 is sub-therapeutic for this patient (goal range 2-3) and is reflective of 25/27.5 mg TWD alternating dose.   Patient verifies current dosing regimen, patient able to verbally recall dose  Patient reports 0  missed doses since last INR   Patient denies s/sx clotting and/or stroke  Patient denies hematuria, epistaxis, rectal bleeding  Patient denies changes in diet, alcohol, or tobacco use  Reviewed medication list and drug allergies with patient, updated any medication additions or modifications accordingly  Patient also denies any pending medical or dental procedures scheduled at this time  Patient was instructed to take two tablets today (booster of 2.5 mg) then continue alternating dose of 25/27.5 mg TWD and RTC 3 weeks

## 2019-08-23 NOTE — TELEPHONE ENCOUNTER
Updated POC INR date to reflect patient's next scheduled appointment date on 9/13/19    Vonda Jefferson PharmGRAEME   8/23/2019 1:13 PM

## 2019-09-13 ENCOUNTER — HOSPITAL ENCOUNTER (OUTPATIENT)
Dept: PHARMACY | Age: 84
Setting detail: THERAPIES SERIES
Discharge: HOME | End: 2019-09-13
Payer: MEDICARE

## 2019-09-13 DIAGNOSIS — I48.91 ATRIAL FIBRILLATION, UNSPECIFIED TYPE (HCC): ICD-10-CM

## 2019-09-13 LAB
INR BLD: 6
INTERNATIONAL NORMALIZATION RATIO, POC: >8
PROTHROMBIN TIME: 56.5 SEC (ref 12.3–14.9)

## 2019-09-13 PROCEDURE — 99211 OFF/OP EST MAY X REQ PHY/QHP: CPT

## 2019-09-13 PROCEDURE — 85610 PROTHROMBIN TIME: CPT

## 2019-09-16 ENCOUNTER — HOSPITAL ENCOUNTER (OUTPATIENT)
Dept: PHARMACY | Age: 84
Setting detail: THERAPIES SERIES
Discharge: HOME | End: 2019-09-16
Payer: MEDICARE

## 2019-09-16 DIAGNOSIS — I48.91 ATRIAL FIBRILLATION, UNSPECIFIED TYPE (HCC): ICD-10-CM

## 2019-09-16 LAB — INTERNATIONAL NORMALIZATION RATIO, POC: 4.7

## 2019-09-16 PROCEDURE — 85610 PROTHROMBIN TIME: CPT

## 2019-09-16 PROCEDURE — 99211 OFF/OP EST MAY X REQ PHY/QHP: CPT

## 2019-09-20 ENCOUNTER — HOSPITAL ENCOUNTER (OUTPATIENT)
Dept: PHARMACY | Age: 84
Setting detail: THERAPIES SERIES
Discharge: HOME | End: 2019-09-20
Payer: MEDICARE

## 2019-09-20 DIAGNOSIS — I48.91 ATRIAL FIBRILLATION, UNSPECIFIED TYPE (HCC): ICD-10-CM

## 2019-09-20 LAB — INTERNATIONAL NORMALIZATION RATIO, POC: 1.3

## 2019-09-20 PROCEDURE — 99211 OFF/OP EST MAY X REQ PHY/QHP: CPT

## 2019-09-20 PROCEDURE — 85610 PROTHROMBIN TIME: CPT

## 2019-09-27 ENCOUNTER — HOSPITAL ENCOUNTER (OUTPATIENT)
Dept: PHARMACY | Age: 84
Setting detail: THERAPIES SERIES
Discharge: HOME | End: 2019-09-27
Payer: MEDICARE

## 2019-09-27 DIAGNOSIS — I48.91 ATRIAL FIBRILLATION, UNSPECIFIED TYPE (HCC): ICD-10-CM

## 2019-09-27 LAB — INTERNATIONAL NORMALIZATION RATIO, POC: 1.4

## 2019-09-27 PROCEDURE — 85610 PROTHROMBIN TIME: CPT

## 2019-09-27 PROCEDURE — 99211 OFF/OP EST MAY X REQ PHY/QHP: CPT

## 2019-10-07 ENCOUNTER — HOSPITAL ENCOUNTER (OUTPATIENT)
Dept: PHARMACY | Age: 84
Setting detail: THERAPIES SERIES
Discharge: HOME | End: 2019-10-07
Payer: MEDICARE

## 2019-10-07 DIAGNOSIS — I48.91 ATRIAL FIBRILLATION, UNSPECIFIED TYPE (HCC): ICD-10-CM

## 2019-10-07 LAB — INTERNATIONAL NORMALIZATION RATIO, POC: 1.8

## 2019-10-07 PROCEDURE — 99211 OFF/OP EST MAY X REQ PHY/QHP: CPT

## 2019-10-07 PROCEDURE — 85610 PROTHROMBIN TIME: CPT

## 2019-10-07 RX ORDER — WARFARIN SODIUM 2.5 MG/1
TABLET ORAL
Qty: 150 TABLET | Refills: 1 | OUTPATIENT
Start: 2019-10-07

## 2019-10-21 ENCOUNTER — HOSPITAL ENCOUNTER (OUTPATIENT)
Dept: PHARMACY | Age: 84
Setting detail: THERAPIES SERIES
Discharge: HOME | End: 2019-10-21
Payer: MEDICARE

## 2019-10-21 DIAGNOSIS — I48.91 ATRIAL FIBRILLATION, UNSPECIFIED TYPE (HCC): ICD-10-CM

## 2019-10-21 LAB — INTERNATIONAL NORMALIZATION RATIO, POC: 1.8

## 2019-10-21 PROCEDURE — 85610 PROTHROMBIN TIME: CPT

## 2019-10-21 PROCEDURE — 99211 OFF/OP EST MAY X REQ PHY/QHP: CPT

## 2019-11-04 ENCOUNTER — HOSPITAL ENCOUNTER (OUTPATIENT)
Dept: PHARMACY | Age: 84
Setting detail: THERAPIES SERIES
Discharge: HOME | End: 2019-11-04
Payer: MEDICARE

## 2019-11-04 DIAGNOSIS — I48.91 ATRIAL FIBRILLATION, UNSPECIFIED TYPE (HCC): ICD-10-CM

## 2019-11-04 LAB
INR BLD: 6
PROTHROMBIN TIME: 56.5 SEC (ref 12.3–14.9)

## 2019-11-04 PROCEDURE — 85610 PROTHROMBIN TIME: CPT

## 2019-11-04 PROCEDURE — 99211 OFF/OP EST MAY X REQ PHY/QHP: CPT

## 2019-11-11 ENCOUNTER — APPOINTMENT (OUTPATIENT)
Dept: PHARMACY | Age: 84
End: 2019-11-11
Payer: MEDICARE

## 2019-11-11 ENCOUNTER — TELEPHONE (OUTPATIENT)
Dept: PHARMACY | Age: 84
End: 2019-11-11

## 2019-11-11 DIAGNOSIS — I48.91 ATRIAL FIBRILLATION, UNSPECIFIED TYPE (HCC): ICD-10-CM

## 2020-11-03 PROBLEM — F32.A DEPRESSION: Status: RESOLVED | Noted: 2018-06-06 | Resolved: 2020-11-03

## 2021-06-08 NOTE — TELEPHONE ENCOUNTER
Last seen 7/12/18  Last ordered 9/26/17 <<----- Click to add NO pertinent Past Medical History No pertinent past medical history

## 2023-01-11 NOTE — CARE COORDINATION
Holter monitor results received via the [facility]. Results were uploaded into Epic, printed, and placed in Dr. Yao's in-basket for review.        Received call from daughter logan requesting letter for mail to be delivered to [place] . Informed letter will be mailed to him. She is also requesting lower dose valium rx .  Reviewed with her his need to weigh daily and report weight gain to  . She verbalized understanding

## 2023-11-02 NOTE — TELEPHONE ENCOUNTER
Ifeanyi Torres, could you call Christine Berumen and order a facemask for his Oxygen system please. Thanks. What Is The Reason For Today's Visit?: Full Body Skin Examination What Is The Reason For Today's Visit? (Being Monitored For X): the development of new lesions